# Patient Record
Sex: FEMALE | Race: OTHER | NOT HISPANIC OR LATINO | URBAN - METROPOLITAN AREA
[De-identification: names, ages, dates, MRNs, and addresses within clinical notes are randomized per-mention and may not be internally consistent; named-entity substitution may affect disease eponyms.]

---

## 2023-01-30 PROBLEM — Z00.00 ENCOUNTER FOR PREVENTIVE HEALTH EXAMINATION: Status: ACTIVE | Noted: 2023-01-30

## 2023-01-31 ENCOUNTER — EMERGENCY (EMERGENCY)
Facility: HOSPITAL | Age: 33
LOS: 0 days | Discharge: HOME | End: 2023-02-01
Attending: EMERGENCY MEDICINE | Admitting: EMERGENCY MEDICINE
Payer: MEDICAID

## 2023-01-31 VITALS
RESPIRATION RATE: 18 BRPM | SYSTOLIC BLOOD PRESSURE: 149 MMHG | OXYGEN SATURATION: 99 % | DIASTOLIC BLOOD PRESSURE: 82 MMHG | WEIGHT: 169.09 LBS | HEART RATE: 135 BPM | TEMPERATURE: 97 F | HEIGHT: 67 IN

## 2023-01-31 PROCEDURE — 99284 EMERGENCY DEPT VISIT MOD MDM: CPT

## 2023-02-01 VITALS
RESPIRATION RATE: 18 BRPM | SYSTOLIC BLOOD PRESSURE: 137 MMHG | TEMPERATURE: 97 F | HEART RATE: 100 BPM | DIASTOLIC BLOOD PRESSURE: 82 MMHG | OXYGEN SATURATION: 100 %

## 2023-02-01 DIAGNOSIS — E11.65 TYPE 2 DIABETES MELLITUS WITH HYPERGLYCEMIA: ICD-10-CM

## 2023-02-01 DIAGNOSIS — Z79.84 LONG TERM (CURRENT) USE OF ORAL HYPOGLYCEMIC DRUGS: ICD-10-CM

## 2023-02-01 DIAGNOSIS — O24.111 PRE-EXISTING TYPE 2 DIABETES MELLITUS, IN PREGNANCY, FIRST TRIMESTER: ICD-10-CM

## 2023-02-01 DIAGNOSIS — Z3A.01 LESS THAN 8 WEEKS GESTATION OF PREGNANCY: ICD-10-CM

## 2023-02-01 NOTE — ED PROVIDER NOTE - CARE PROVIDER_API CALL
Lianna Anthony)  Internal Medicine; Nephrology  [address] [place] [id number]  Phone: [phone]  Fax: [phone]  Follow Up Time:     Alen Love  ENDOCRINOLOGY/METAB/DIABETES  [address] [place] [address]  Phone: [phone]  Fax: [phone]  Follow Up Time:

## 2023-02-01 NOTE — ED PROVIDER NOTE - NSFOLLOWUPINSTRUCTIONS_ED_ALL_ED_FT
Follow up with your primary care doctor in 1-2 days.    FOLLOW UP WITH YOUR OB/GYN AT YOUR SCHEDULED APPOINTMENT ON THURSDAY FEB 2ND    follow up with endocrinology and nephrology in 1-2 weeks     Hyperglycemia  Hyperglycemia occurs when the level of sugar (glucose) in the blood is too high. Glucose is a type of sugar that provides the body's main source of energy. Certain hormones (insulin and glucagon) control the level of glucose in the blood. Insulin lowers blood glucose, and glucagon increases blood glucose. Hyperglycemia can result from having too little insulin in the bloodstream, or from the body not responding normally to insulin.    Hyperglycemia occurs most often in people who have diabetes (diabetes mellitus), but it can happen in people who do not have diabetes. It can develop quickly, and it can be life-threatening if it causes you to become severely dehydrated (diabetic ketoacidosis or hyperglycemic hyperosmolar state). Severe hyperglycemia is a medical emergency.    What are the causes?  If you have diabetes, hyperglycemia may be caused by:    Diabetes medicine.  Medicines that increase blood glucose or affect your diabetes control.  Not eating enough, or not eating often enough.  Changes in physical activity level.  Being sick or having an infection.    If you have prediabetes or undiagnosed diabetes:    Hyperglycemia may be caused by those conditions.    If you do not have diabetes, hyperglycemia may be caused by:    Certain medicines, including steroid medicines, beta-blockers, epinephrine, and thiazide diuretics.  Stress.  Serious illness.  Surgery.  Diseases of the pancreas.  Infection.    What increases the risk?  Hyperglycemia is more likely to develop in people who have risk factors for diabetes, such as:    Having a family member with diabetes.  Having a gene for type 1 diabetes that is passed from parent to child (inherited).  Living in an area with cold weather conditions.  Exposure to certain viruses.  Certain conditions in which the body's disease-fighting (immune) system attacks itself (autoimmune disorders).  Being overweight or obese.  Having an inactive (sedentary) lifestyle.  Having been diagnosed with insulin resistance.  Having a history of prediabetes, gestational diabetes, or polycystic ovarian syndrome (PCOS).  Being of American-, -American, /, or / descent.    What are the signs or symptoms?  Hyperglycemia may not cause any symptoms. If you do have symptoms, they may include early warning signs, such as:    Increased thirst.  Hunger.  Feeling very tired.  Needing to urinate more often than usual.  Blurry vision.    Other symptoms may develop if hyperglycemia gets worse, such as:    Dry mouth.  Loss of appetite.  Fruity-smelling breath.  Weakness.  Unexpected or rapid weight gain or weight loss.  Tingling or numbness in the hands or feet.  Headache.  Skin that does not quickly return to normal after being lightly pinched and released (poor skin turgor).  Abdominal pain.  Cuts or bruises that are slow to heal.    How is this diagnosed?  Hyperglycemia is diagnosed with a blood test to measure your blood glucose level. This blood test is usually done while you are having symptoms. Your health care provider may also do a physical exam and review your medical history.    You may have more tests to determine the cause of your hyperglycemia, such as:    A fasting blood glucose (FBG) test. You will not be allowed to eat (you will fast) for at least 8 hours before a blood sample is taken.  An A1c (hemoglobin A1c) blood test. This provides information about blood glucose control over the previous 2–3 months.  An oral glucose tolerance test (OGTT). This measures your blood glucose at two times:    After fasting. This is your baseline blood glucose level.  Two hours after drinking a beverage that contains glucose.      How is this treated?  Treatment depends on the cause of your hyperglycemia. Treatment may include:    Taking medicine to regulate your blood glucose levels. If you take insulin or other diabetes medicines, your medicine or dosage may be adjusted.  Lifestyle changes, such as exercising more, eating healthier foods, or losing weight.  Treating an illness or infection, if this caused your hyperglycemia.  Checking your blood glucose more often.  Stopping or reducing steroid medicines, if these caused your hyperglycemia.    If your hyperglycemia becomes severe and it results in hyperglycemic hyperosmolar state, you must be hospitalized and given IV fluids.    Follow these instructions at home:  General instructions     Take over-the-counter and prescription medicines only as told by your health care provider.  Do not use any products that contain nicotine or tobacco, such as cigarettes and e-cigarettes. If you need help quitting, ask your health care provider.  Limit alcohol intake to no more than 1 drink per day for nonpregnant women and 2 drinks per day for men. One drink equals 12 oz of beer, 5 oz of wine, or 1½ oz of hard liquor.  Learn to manage stress. If you need help with this, ask your health care provider.  Keep all follow-up visits as told by your health care provider. This is important.  Eating and drinking     Maintain a healthy weight.  Exercise regularly, as directed by your health care provider.  Stay hydrated, especially when you exercise, get sick, or spend time in hot temperatures.  Eat healthy foods, such as:    Lean proteins.  Complex carbohydrates.  Fresh fruits and vegetables.  Low-fat dairy products.  Healthy fats.    ImageDrink enough fluid to keep your urine clear or pale yellow.  If you have diabetes:     Image   Make sure you know the symptoms of hyperglycemia.  Follow your diabetes management plan, as told by your health care provider. Make sure you:    Take your insulin and medicines as directed.  Follow your exercise plan.  Follow your meal plan. Eat on time, and do not skip meals.  Check your blood glucose as often as directed. Make sure to check your blood glucose before and after exercise. If you exercise longer or in a different way than usual, check your blood glucose more often.  Follow your sick day plan whenever you cannot eat or drink normally. Make this plan in advance with your health care provider.    Share your diabetes management plan with people in your workplace, school, and household.  Check your urine for ketones when you are ill and as told by your health care provider.  Carry a medical alert card or wear medical alert jewelry.  Contact a health care provider if:  Your blood glucose is at or above 240 mg/dL (13.3 mmol/L) for 2 days in a row.  You have problems keeping your blood glucose in your target range.  You have frequent episodes of hyperglycemia.  Get help right away if:  You have difficulty breathing.  You have a change in how you think, feel, or act (mental status).  You have nausea or vomiting that does not go away.  These symptoms may represent a serious problem that is an emergency. Do not wait to see if the symptoms will go away. Get medical help right away. Call your local emergency services (911 in the [place] Do not drive yourself to the hospital.     Summary  Hyperglycemia occurs when the level of sugar (glucose) in the blood is too high.  Hyperglycemia is diagnosed with a blood test to measure your blood glucose level. This blood test is usually done while you are having symptoms. Your health care provider may also do a physical exam and review your medical history.  If you have diabetes, follow your diabetes management plan as told by your health care provider.  Contact your health care provider if you have problems keeping your blood glucose in your target range.  This information is not intended to replace advice given to you by your health care provider. Make sure you discuss any questions you have with your health care provider.    Pregnancy    WHAT YOU NEED TO KNOW:    A normal pregnancy lasts about 40 weeks. The first trimester lasts from your last period through the 12th week of pregnancy. The second trimester lasts from the 13th week of your pregnancy through the 23rd week. The third trimester lasts from your 24th week of pregnancy until your baby is born. If you know the date of your last period, your healthcare provider can estimate your due date. You may give birth to your baby any time from 37 weeks to 2 weeks after your due date.    DISCHARGE INSTRUCTIONS:    Return to the emergency department if:     You develop a severe headache that does not go away.      You have new or increased vision changes, such as blurred or spotted vision.      You have new or increased swelling in your face or hands.      You have pain or cramping in your abdomen or low back.      You have vaginal bleeding.    Contact your healthcare provider or obstetrician if:     You have abdominal cramps, pressure, or tightening.      You have a change in vaginal discharge.      You cannot keep food or drinks down, and you are losing weight.      You have chills or a fever.      You have vaginal itching, burning, or pain.       You have yellow, green, white, or foul-smelling vaginal discharge.      You have pain or burning when you urinate, less urine than usual, or pink or bloody urine.      You have questions or concerns about your condition or care.    Medicines:     Prenatal vitamins provide some of the extra vitamins and minerals you need during pregnancy. Prenatal vitamins may also help to decrease the risk of certain birth defects.       Take your medicine as directed. Contact your healthcare provider if you think your medicine is not helping or if you have side effects. Tell him or her if you are allergic to any medicine. Keep a list of the medicines, vitamins, and herbs you take. Include the amounts, and when and why you take them. Bring the list or the pill bottles to follow-up visits. Carry your medicine list with you in case of an emergency.    Follow up with your healthcare provider or obstetrician as directed: Go to all of your prenatal visits during your pregnancy. Write down your questions so you remember to ask them during your visits.    Body changes that may occur during your pregnancy:     Breast changes you will experience include tenderness and tingling during the early part of your pregnancy. Your breasts will become larger. You may need to use a support bra. You may see a thin, yellow fluid, called colostrum, leak from your nipples during the second trimester. Colostrum is a liquid that changes to milk about 3 days after you give birth.      Skin changes and stretch marks may occur during your pregnancy. You may have red marks, called stretch marks, on your skin. Stretch marks will usually fade after pregnancy. Use lotion if your skin is dry and itchy. The skin on your face, around your nipples, and below your belly button may darken. Most of the time, your skin will return to its normal color after your baby is born.       Morning sickness is nausea and vomiting that can happen at any time of day. Avoid fatty and spicy foods. Eat small meals throughout the day instead of large meals. Shauna may help to decrease nausea. Ask your healthcare provider about other ways of decreasing nausea and vomiting.      Heartburn may be caused by changes in your hormones during pregnancy. Your growing uterus may also push your stomach upward and force stomach acid to back up into your esophagus. Eat 4 or 5 small meals each day instead of large meals. Avoid spicy foods. Avoid eating right before bedtime.      Constipation may develop during your pregnancy. To treat constipation, eat foods high in fiber such as fiber cereals, beans, fruits, vegetables, whole-grain breads, and prune juice. Get regular exercise and drink plenty of water. Your healthcare provider may also suggest a fiber supplement to soften your bowel movements. Talk to your healthcare provider before you use any medicines to decrease constipation.      Hemorrhoids are enlarged veins in the rectal area. They may cause pain, itching, and bright red bleeding from your rectum. To decrease your risk of hemorrhoids, prevent constipation and do not strain to have a bowel movement. If you have hemorrhoids, soak in a tub of warm water to ease discomfort. Ask your healthcare provider how you can treat hemorrhoids.       Leg cramps and swelling may be caused by low calcium levels or the added weight of pregnancy. Raise your legs above the level of your heart to decrease swelling. During a leg cramp, stretch or massage the muscle that has the cramp. Heat may help decrease pain and muscle spasms. Apply heat on your muscle for 20 to 30 minutes every 2 hours for as many days as directed.      Back pain may occur as your baby grows. Do not stand for long periods of time or lift heavy items. Use good posture while you stand, squat, or bend. Wear low-heeled shoes with good support. Rest may also help to relieve back pain. Ask your healthcare provider about exercises you can do to strengthen your back muscles.     Stay healthy during your pregnancy:     Eat a variety of healthy foods. Healthy foods include fruits, vegetables, whole-grain breads, low-fat dairy foods, beans, lean meats, and fish. Drink liquids as directed. Ask how much liquid to drink each day and which liquids are best for you. Limit caffeine to less than 200 milligrams each day. Limit your intake of fish to 2 servings each week. Choose fish low in mercury such as canned light tuna, shrimp, crab, salmon, cod, or tilapia. Do not eat fish high in mercury such as swordfish, tilefish, alicja mackerel, and shark.       Take prenatal vitamins as directed. Your need for certain vitamins and minerals, such as folic acid, increases during pregnancy. Prenatal vitamins provide some of the extra vitamins and minerals you need. Prenatal vitamins may also help to decrease the risk of certain birth defects.       Ask how much weight you should gain during your pregnancy. Too much or too little weight gain can be unhealthy for you and your baby.       Talk to your healthcare provider about exercise. Moderate exercise can help you stay fit. Your healthcare provider will help you plan an exercise program that is safe for you during pregnancy.       Do not smoke. Smoking increases your risk of a miscarriage and other health problems during your pregnancy. Smoking can cause your baby to be born too early or weigh less at birth. Quit smoking as soon as you think you might be pregnant. Ask your healthcare provider for information if you need help quitting.      Do not drink alcohol. Alcohol passes from your body to your baby through the placenta. It can affect your baby's brain development and cause fetal alcohol syndrome (FAS). FAS is a group of conditions that causes mental, behavior, and growth problems.       Talk to your healthcare provider before you take any medicines. Many medicines may harm your baby if you take them when you are pregnant. Do not take any medicines, vitamins, herbs, or supplements without first talking to your healthcare provider. Never use illegal or street drugs (such as marijuana or cocaine) while you are pregnant.     Safety tips:     Avoid hot tubs and saunas. Do not use a hot tub or sauna while you are pregnant, especially during your first trimester. Hot tubs and saunas may raise your baby's temperature and increase the risk of birth defects.      Avoid toxoplasmosis. This is an infection caused by eating raw meat or being around infected cat feces. It can cause birth defects, miscarriages, and other problems. Wash your hands after you touch raw meat. Make sure any meat is well-cooked before you eat it. Avoid raw eggs and unpasteurized milk. Use gloves or ask someone else to clean your cat's litter box while you are pregnant.       Ask your healthcare provider about travel. The most comfortable time to travel is during the second trimester. Ask your healthcare provider if you can travel after 36 weeks. You may not be able to travel in an airplane after 36 weeks. He may also recommend that you avoid long road trips.          © Copyright [employer] 2019 All illustrations and images included in CareNotes are the copyrighted property of [employer] or [employer]. Follow up with your primary care doctor in 1-2 days.    FOLLOW UP WITH YOUR OB/GYN AT YOUR SCHEDULED APPOINTMENT ON THURSDAY FEB 2ND    follow up with endocrinology and nephrology in 1-2 weeks     Our Emergency Department Referral Coordinators will be reaching out to you in the next 24-48 hours from 9:00am to 5:00pm with a follow up appointment. Please expect a phone call from the hospital in that time frame. If you do not receive a call or if you have any questions or concerns, you can reach them at [phone]      Hyperglycemia  Hyperglycemia occurs when the level of sugar (glucose) in the blood is too high. Glucose is a type of sugar that provides the body's main source of energy. Certain hormones (insulin and glucagon) control the level of glucose in the blood. Insulin lowers blood glucose, and glucagon increases blood glucose. Hyperglycemia can result from having too little insulin in the bloodstream, or from the body not responding normally to insulin.    Hyperglycemia occurs most often in people who have diabetes (diabetes mellitus), but it can happen in people who do not have diabetes. It can develop quickly, and it can be life-threatening if it causes you to become severely dehydrated (diabetic ketoacidosis or hyperglycemic hyperosmolar state). Severe hyperglycemia is a medical emergency.    What are the causes?  If you have diabetes, hyperglycemia may be caused by:    Diabetes medicine.  Medicines that increase blood glucose or affect your diabetes control.  Not eating enough, or not eating often enough.  Changes in physical activity level.  Being sick or having an infection.    If you have prediabetes or undiagnosed diabetes:    Hyperglycemia may be caused by those conditions.    If you do not have diabetes, hyperglycemia may be caused by:    Certain medicines, including steroid medicines, beta-blockers, epinephrine, and thiazide diuretics.  Stress.  Serious illness.  Surgery.  Diseases of the pancreas.  Infection.    What increases the risk?  Hyperglycemia is more likely to develop in people who have risk factors for diabetes, such as:    Having a family member with diabetes.  Having a gene for type 1 diabetes that is passed from parent to child (inherited).  Living in an area with cold weather conditions.  Exposure to certain viruses.  Certain conditions in which the body's disease-fighting (immune) system attacks itself (autoimmune disorders).  Being overweight or obese.  Having an inactive (sedentary) lifestyle.  Having been diagnosed with insulin resistance.  Having a history of prediabetes, gestational diabetes, or polycystic ovarian syndrome (PCOS).  Being of American-, -American, /, or / descent.    What are the signs or symptoms?  Hyperglycemia may not cause any symptoms. If you do have symptoms, they may include early warning signs, such as:    Increased thirst.  Hunger.  Feeling very tired.  Needing to urinate more often than usual.  Blurry vision.    Other symptoms may develop if hyperglycemia gets worse, such as:    Dry mouth.  Loss of appetite.  Fruity-smelling breath.  Weakness.  Unexpected or rapid weight gain or weight loss.  Tingling or numbness in the hands or feet.  Headache.  Skin that does not quickly return to normal after being lightly pinched and released (poor skin turgor).  Abdominal pain.  Cuts or bruises that are slow to heal.    How is this diagnosed?  Hyperglycemia is diagnosed with a blood test to measure your blood glucose level. This blood test is usually done while you are having symptoms. Your health care provider may also do a physical exam and review your medical history.    You may have more tests to determine the cause of your hyperglycemia, such as:    A fasting blood glucose (FBG) test. You will not be allowed to eat (you will fast) for at least 8 hours before a blood sample is taken.  An A1c (hemoglobin A1c) blood test. This provides information about blood glucose control over the previous 2–3 months.  An oral glucose tolerance test (OGTT). This measures your blood glucose at two times:    After fasting. This is your baseline blood glucose level.  Two hours after drinking a beverage that contains glucose.      How is this treated?  Treatment depends on the cause of your hyperglycemia. Treatment may include:    Taking medicine to regulate your blood glucose levels. If you take insulin or other diabetes medicines, your medicine or dosage may be adjusted.  Lifestyle changes, such as exercising more, eating healthier foods, or losing weight.  Treating an illness or infection, if this caused your hyperglycemia.  Checking your blood glucose more often.  Stopping or reducing steroid medicines, if these caused your hyperglycemia.    If your hyperglycemia becomes severe and it results in hyperglycemic hyperosmolar state, you must be hospitalized and given IV fluids.    Follow these instructions at home:  General instructions     Take over-the-counter and prescription medicines only as told by your health care provider.  Do not use any products that contain nicotine or tobacco, such as cigarettes and e-cigarettes. If you need help quitting, ask your health care provider.  Limit alcohol intake to no more than 1 drink per day for nonpregnant women and 2 drinks per day for men. One drink equals 12 oz of beer, 5 oz of wine, or 1½ oz of hard liquor.  Learn to manage stress. If you need help with this, ask your health care provider.  Keep all follow-up visits as told by your health care provider. This is important.  Eating and drinking     Maintain a healthy weight.  Exercise regularly, as directed by your health care provider.  Stay hydrated, especially when you exercise, get sick, or spend time in hot temperatures.  Eat healthy foods, such as:    Lean proteins.  Complex carbohydrates.  Fresh fruits and vegetables.  Low-fat dairy products.  Healthy fats.    ImageDrink enough fluid to keep your urine clear or pale yellow.  If you have diabetes:     Image   Make sure you know the symptoms of hyperglycemia.  Follow your diabetes management plan, as told by your health care provider. Make sure you:    Take your insulin and medicines as directed.  Follow your exercise plan.  Follow your meal plan. Eat on time, and do not skip meals.  Check your blood glucose as often as directed. Make sure to check your blood glucose before and after exercise. If you exercise longer or in a different way than usual, check your blood glucose more often.  Follow your sick day plan whenever you cannot eat or drink normally. Make this plan in advance with your health care provider.    Share your diabetes management plan with people in your workplace, school, and household.  Check your urine for ketones when you are ill and as told by your health care provider.  Carry a medical alert card or wear medical alert jewelry.  Contact a health care provider if:  Your blood glucose is at or above 240 mg/dL (13.3 mmol/L) for 2 days in a row.  You have problems keeping your blood glucose in your target range.  You have frequent episodes of hyperglycemia.  Get help right away if:  You have difficulty breathing.  You have a change in how you think, feel, or act (mental status).  You have nausea or vomiting that does not go away.  These symptoms may represent a serious problem that is an emergency. Do not wait to see if the symptoms will go away. Get medical help right away. Call your local emergency services (911 in the [place] Do not drive yourself to the hospital.     Summary  Hyperglycemia occurs when the level of sugar (glucose) in the blood is too high.  Hyperglycemia is diagnosed with a blood test to measure your blood glucose level. This blood test is usually done while you are having symptoms. Your health care provider may also do a physical exam and review your medical history.  If you have diabetes, follow your diabetes management plan as told by your health care provider.  Contact your health care provider if you have problems keeping your blood glucose in your target range.  This information is not intended to replace advice given to you by your health care provider. Make sure you discuss any questions you have with your health care provider.    Pregnancy    WHAT YOU NEED TO KNOW:    A normal pregnancy lasts about 40 weeks. The first trimester lasts from your last period through the 12th week of pregnancy. The second trimester lasts from the 13th week of your pregnancy through the 23rd week. The third trimester lasts from your 24th week of pregnancy until your baby is born. If you know the date of your last period, your healthcare provider can estimate your due date. You may give birth to your baby any time from 37 weeks to 2 weeks after your due date.    DISCHARGE INSTRUCTIONS:    Return to the emergency department if:     You develop a severe headache that does not go away.      You have new or increased vision changes, such as blurred or spotted vision.      You have new or increased swelling in your face or hands.      You have pain or cramping in your abdomen or low back.      You have vaginal bleeding.    Contact your healthcare provider or obstetrician if:     You have abdominal cramps, pressure, or tightening.      You have a change in vaginal discharge.      You cannot keep food or drinks down, and you are losing weight.      You have chills or a fever.      You have vaginal itching, burning, or pain.       You have yellow, green, white, or foul-smelling vaginal discharge.      You have pain or burning when you urinate, less urine than usual, or pink or bloody urine.      You have questions or concerns about your condition or care.    Medicines:     Prenatal vitamins provide some of the extra vitamins and minerals you need during pregnancy. Prenatal vitamins may also help to decrease the risk of certain birth defects.       Take your medicine as directed. Contact your healthcare provider if you think your medicine is not helping or if you have side effects. Tell him or her if you are allergic to any medicine. Keep a list of the medicines, vitamins, and herbs you take. Include the amounts, and when and why you take them. Bring the list or the pill bottles to follow-up visits. Carry your medicine list with you in case of an emergency.    Follow up with your healthcare provider or obstetrician as directed: Go to all of your prenatal visits during your pregnancy. Write down your questions so you remember to ask them during your visits.    Body changes that may occur during your pregnancy:     Breast changes you will experience include tenderness and tingling during the early part of your pregnancy. Your breasts will become larger. You may need to use a support bra. You may see a thin, yellow fluid, called colostrum, leak from your nipples during the second trimester. Colostrum is a liquid that changes to milk about 3 days after you give birth.      Skin changes and stretch marks may occur during your pregnancy. You may have red marks, called stretch marks, on your skin. Stretch marks will usually fade after pregnancy. Use lotion if your skin is dry and itchy. The skin on your face, around your nipples, and below your belly button may darken. Most of the time, your skin will return to its normal color after your baby is born.       Morning sickness is nausea and vomiting that can happen at any time of day. Avoid fatty and spicy foods. Eat small meals throughout the day instead of large meals. Shauna may help to decrease nausea. Ask your healthcare provider about other ways of decreasing nausea and vomiting.      Heartburn may be caused by changes in your hormones during pregnancy. Your growing uterus may also push your stomach upward and force stomach acid to back up into your esophagus. Eat 4 or 5 small meals each day instead of large meals. Avoid spicy foods. Avoid eating right before bedtime.      Constipation may develop during your pregnancy. To treat constipation, eat foods high in fiber such as fiber cereals, beans, fruits, vegetables, whole-grain breads, and prune juice. Get regular exercise and drink plenty of water. Your healthcare provider may also suggest a fiber supplement to soften your bowel movements. Talk to your healthcare provider before you use any medicines to decrease constipation.      Hemorrhoids are enlarged veins in the rectal area. They may cause pain, itching, and bright red bleeding from your rectum. To decrease your risk of hemorrhoids, prevent constipation and do not strain to have a bowel movement. If you have hemorrhoids, soak in a tub of warm water to ease discomfort. Ask your healthcare provider how you can treat hemorrhoids.       Leg cramps and swelling may be caused by low calcium levels or the added weight of pregnancy. Raise your legs above the level of your heart to decrease swelling. During a leg cramp, stretch or massage the muscle that has the cramp. Heat may help decrease pain and muscle spasms. Apply heat on your muscle for 20 to 30 minutes every 2 hours for as many days as directed.      Back pain may occur as your baby grows. Do not stand for long periods of time or lift heavy items. Use good posture while you stand, squat, or bend. Wear low-heeled shoes with good support. Rest may also help to relieve back pain. Ask your healthcare provider about exercises you can do to strengthen your back muscles.     Stay healthy during your pregnancy:     Eat a variety of healthy foods. Healthy foods include fruits, vegetables, whole-grain breads, low-fat dairy foods, beans, lean meats, and fish. Drink liquids as directed. Ask how much liquid to drink each day and which liquids are best for you. Limit caffeine to less than 200 milligrams each day. Limit your intake of fish to 2 servings each week. Choose fish low in mercury such as canned light tuna, shrimp, crab, salmon, cod, or tilapia. Do not eat fish high in mercury such as swordfish, tilefish, alicja mackerel, and shark.       Take prenatal vitamins as directed. Your need for certain vitamins and minerals, such as folic acid, increases during pregnancy. Prenatal vitamins provide some of the extra vitamins and minerals you need. Prenatal vitamins may also help to decrease the risk of certain birth defects.       Ask how much weight you should gain during your pregnancy. Too much or too little weight gain can be unhealthy for you and your baby.       Talk to your healthcare provider about exercise. Moderate exercise can help you stay fit. Your healthcare provider will help you plan an exercise program that is safe for you during pregnancy.       Do not smoke. Smoking increases your risk of a miscarriage and other health problems during your pregnancy. Smoking can cause your baby to be born too early or weigh less at birth. Quit smoking as soon as you think you might be pregnant. Ask your healthcare provider for information if you need help quitting.      Do not drink alcohol. Alcohol passes from your body to your baby through the placenta. It can affect your baby's brain development and cause fetal alcohol syndrome (FAS). FAS is a group of conditions that causes mental, behavior, and growth problems.       Talk to your healthcare provider before you take any medicines. Many medicines may harm your baby if you take them when you are pregnant. Do not take any medicines, vitamins, herbs, or supplements without first talking to your healthcare provider. Never use illegal or street drugs (such as marijuana or cocaine) while you are pregnant.     Safety tips:     Avoid hot tubs and saunas. Do not use a hot tub or sauna while you are pregnant, especially during your first trimester. Hot tubs and saunas may raise your baby's temperature and increase the risk of birth defects.      Avoid toxoplasmosis. This is an infection caused by eating raw meat or being around infected cat feces. It can cause birth defects, miscarriages, and other problems. Wash your hands after you touch raw meat. Make sure any meat is well-cooked before you eat it. Avoid raw eggs and unpasteurized milk. Use gloves or ask someone else to clean your cat's litter box while you are pregnant.       Ask your healthcare provider about travel. The most comfortable time to travel is during the second trimester. Ask your healthcare provider if you can travel after 36 weeks. You may not be able to travel in an airplane after 36 weeks. He may also recommend that you avoid long road trips.          © Copyright [employer] 2019 All illustrations and images included in CareNotes are the copyrighted property of [employer] or [employer].

## 2023-02-01 NOTE — ED PROVIDER NOTE - CLINICAL SUMMARY MEDICAL DECISION MAKING FREE TEXT BOX
32yF on janumet/metformin for  DM after  1st pregnancy    pw  + pregnancy test 3 days ago LMP dec 27 ,  has  first appointment with OB  in 2 days,  no abdominal pain or vaginal bleeding   , presents for  outpt elevated hgb a1c of 8, and high albumin/crea ratio   done outpt  by PMD  Dr zapata.  FS in .   Dw patient  no emergency intervention at this time. discussed and given outpt endocrine and nephrology follow up . Pt and  verbalized understanding to outtpt speciality follow  up  Patient to be discharged from ED in well appearing condition. Any available test results were discussed with and printed  for patient.  Verbal instructions given, including instructions to return to ED immediately for any new, worsening, or concerning symptoms. Limitations of ED work up discussed.  Patient reports understanding of above with capacity and insight. Written discharge instructions additionally given, including follow-up plan.

## 2023-02-01 NOTE — ED PROVIDER NOTE - NS ED ATTENDING STATEMENT MOD
This was a shared visit with the NICO. I reviewed and verified the documentation and independently performed the documented:

## 2023-02-01 NOTE — ED PROVIDER NOTE - NSPTACCESSSVCSAPPTDETAILS_ED_ALL_ED_FT
Patient needs follow up with Endocrinology and Nephrology for diabetes management and protein in urine. patient is also pregnant. follow up in 1-2 weeks

## 2023-02-01 NOTE — ED PROVIDER NOTE - CARE PROVIDERS DIRECT ADDRESSES
,[email or web address]_Unknown

## 2023-02-01 NOTE — ED PROVIDER NOTE - OBJECTIVE STATEMENT
32-year-old female past medical history of diabetes comes in the emergency room for elevated blood sugar.  Patient states that she tested positive for pregnancy 3 days ago, was called by primary care due to abnormal lab work and was advised due to pregnancy to come to the emergency room for further evaluation.  Patient states lab work was done last week prior to the knowledge of her pregnancy.  Patient states that she is currently on metformin for diabetes.  Patient states the abnormal lab result the doctor was concerned about was a hemoglobin A1c of 8 and an elevated albumin creatinine ratio, trace protein in urine.  Patient states she did have a history of gestational diabetes which led her to become diabetic.  Patient also admits to having preeclampsia during last trimester.  Patient has not followed up yet with primary care doctor, endocrinology, nephrology for these laboratory values.  Patient denies chest pain shortness of breath fever chills patient denies vaginal bleeding abd cramps

## 2023-02-01 NOTE — ED PROVIDER NOTE - PROGRESS NOTE DETAILS
Patient has OB/GYN appointment scheduled for Thursday, February 2.  Advised patient as well these are abnormal laboratory values they can be followed up outpatient with nephrology and endocrinology.  Patient placed in rapid referral system and number provided for referral coordinator to help expedite care

## 2023-02-01 NOTE — ED PROVIDER NOTE - PATIENT PORTAL LINK FT
You can access the FollowMyHealth Patient Portal offered by [facility] by registering at the following website: [email or web address]. By joining [facility]’s FollowMyHealth portal, you will also be able to view your health information using other applications (apps) compatible with our system.

## 2023-02-02 ENCOUNTER — APPOINTMENT (OUTPATIENT)
Dept: OBGYN | Facility: CLINIC | Age: 33
End: 2023-02-02
Payer: MEDICAID

## 2023-02-02 ENCOUNTER — ASOB RESULT (OUTPATIENT)
Age: 33
End: 2023-02-02

## 2023-02-02 VITALS
HEIGHT: 67 IN | BODY MASS INDEX: 26.53 KG/M2 | HEART RATE: 90 BPM | WEIGHT: 169 LBS | SYSTOLIC BLOOD PRESSURE: 124 MMHG | DIASTOLIC BLOOD PRESSURE: 80 MMHG

## 2023-02-02 DIAGNOSIS — Z78.9 OTHER SPECIFIED HEALTH STATUS: ICD-10-CM

## 2023-02-02 DIAGNOSIS — N91.2 AMENORRHEA, UNSPECIFIED: ICD-10-CM

## 2023-02-02 DIAGNOSIS — Z83.3 FAMILY HISTORY OF DIABETES MELLITUS: ICD-10-CM

## 2023-02-02 LAB
BILIRUB UR QL STRIP: NORMAL
CLARITY UR: NORMAL
COLLECTION METHOD: NORMAL
GLUCOSE UR-MCNC: NORMAL
HCG UR QL: 0.2 EU/DL
HCG UR QL: POSITIVE
HGB UR QL STRIP.AUTO: ABNORMAL
KETONES UR-MCNC: ABNORMAL
LEUKOCYTE ESTERASE UR QL STRIP: NORMAL
NITRITE UR QL STRIP: NORMAL
PH UR STRIP: 6
PROT UR STRIP-MCNC: ABNORMAL
QUALITY CONTROL: YES
SP GR UR STRIP: 1.02

## 2023-02-02 PROCEDURE — 81025 URINE PREGNANCY TEST: CPT

## 2023-02-02 PROCEDURE — 81003 URINALYSIS AUTO W/O SCOPE: CPT | Mod: 59,QW

## 2023-02-02 PROCEDURE — ZZZZZ: CPT

## 2023-02-02 PROCEDURE — 99204 OFFICE O/P NEW MOD 45 MIN: CPT

## 2023-02-02 PROCEDURE — 76830 TRANSVAGINAL US NON-OB: CPT | Mod: 59

## 2023-02-02 RX ORDER — METFORMIN HYDROCHLORIDE 1000 MG/1
1000 TABLET, COATED ORAL
Refills: 0 | Status: ACTIVE | COMMUNITY

## 2023-02-02 NOTE — PROCEDURE
[Amenorrhea] : Amenorrhea [Transvaginal Ultrasound] : transvaginal ultrasound [FreeTextEntry4] : NO IUP SEEN, CYSTIC STRUCTURE NOTED IN THE ENDOMETRIUM

## 2023-02-02 NOTE — PHYSICAL EXAM
[Appropriately responsive] : appropriately responsive [Alert] : alert [No Acute Distress] : no acute distress [No Lymphadenopathy] : no lymphadenopathy [Regular Rate Rhythm] : regular rate rhythm [No Murmurs] : no murmurs [Clear to Auscultation B/L] : clear to auscultation bilaterally [Soft] : soft [Non-tender] : non-tender [Non-distended] : non-distended [No HSM] : No HSM [No Mass] : no mass [No Lesions] : no lesions [Oriented x3] : oriented x3

## 2023-02-02 NOTE — DISCUSSION/SUMMARY
[FreeTextEntry1] : 33 YO  LMP 12/27 AMENORRHEA, POSITIVE PREGNANCY TEST\par SONO - CYSTIC STRUCTURE\par BHGC, PG, T &s\par MFM CONSULT- NOT WELL CONTROLLED DM\par BSQ4 /DAY FOR MFM TO EVALUATE- APPNT MADE 2/7\par RISK OF POORLY CONTROLLED DM IN PREGNANCY INCLUDING BUT NOT LIMITED TO CARDIAC DEVELOPMENT ABNORMALITIES AND SHOULDER  DYSTOCIA  D/ W PATIENT\par F/UP 2 WKS \par LAB WORK REVIEWD\par PNV\par ZOFRAN, B6\par VIT D \par \par

## 2023-02-02 NOTE — HISTORY OF PRESENT ILLNESS
[Y] : Positive pregnancy history [Patient reported PAP Smear was normal] : Patient reported PAP Smear was normal [TextBox_4] : GYNHX\par No history of fibroids, cysts, or STDs\par 15/reg/5, last pap 2019 nl per pt [PapSmeardate] : 2019 [PGxTotal] : 1 [[facility] CenterxFulerm] : 1 [[facility] Hospitaliving] : 1 [FreeTextEntry1] :

## 2023-02-02 NOTE — COUNSELING
[Nutrition/ Exercise/ Weight Management] : nutrition, exercise, weight management [Vitamins/Supplements] : vitamins/supplements [Preconception Care/ Fertility options] : preconception care, fertility options [Pregnancy Options] : pregnancy options

## 2023-02-06 LAB
ABO + RH PNL BLD: NORMAL
BASOPHILS # BLD AUTO: 0.06 K/UL
BASOPHILS NFR BLD AUTO: 1 %
EOSINOPHIL # BLD AUTO: 0.16 K/UL
EOSINOPHIL NFR BLD AUTO: 2.7 %
HCG SERPL-MCNC: 611.9 MIU/ML
HCT VFR BLD CALC: 39.3 %
HGB BLD-MCNC: 12.5 G/DL
IMM GRANULOCYTES NFR BLD AUTO: 0.7 %
LYMPHOCYTES # BLD AUTO: 1.6 K/UL
LYMPHOCYTES NFR BLD AUTO: 27.5 %
MAN DIFF?: NORMAL
MCHC RBC-ENTMCNC: 23.9 PG
MCHC RBC-ENTMCNC: 31.8 G/DL
MCV RBC AUTO: 75.3 FL
MONOCYTES # BLD AUTO: 0.61 K/UL
MONOCYTES NFR BLD AUTO: 10.5 %
NEUTROPHILS # BLD AUTO: 3.35 K/UL
NEUTROPHILS NFR BLD AUTO: 57.6 %
PLATELET # BLD AUTO: 410 K/UL
PROGEST SERPL-MCNC: 10.7 NG/ML
RBC # BLD: 5.22 M/UL
RBC # FLD: 15.9 %
WBC # FLD AUTO: 5.82 K/UL

## 2023-02-07 ENCOUNTER — APPOINTMENT (OUTPATIENT)
Dept: ANTEPARTUM | Facility: CLINIC | Age: 33
End: 2023-02-07
Payer: MEDICAID

## 2023-02-07 ENCOUNTER — RESULT CHARGE (OUTPATIENT)
Age: 33
End: 2023-02-07

## 2023-02-07 ENCOUNTER — APPOINTMENT (OUTPATIENT)
Dept: ANTEPARTUM | Facility: CLINIC | Age: 33
End: 2023-02-07

## 2023-02-07 ENCOUNTER — ASOB RESULT (OUTPATIENT)
Age: 33
End: 2023-02-07

## 2023-02-07 ENCOUNTER — OUTPATIENT (OUTPATIENT)
Dept: OUTPATIENT SERVICES | Facility: HOSPITAL | Age: 33
LOS: 1 days | End: 2023-02-07
Payer: MEDICAID

## 2023-02-07 VITALS
WEIGHT: 168 LBS | OXYGEN SATURATION: 100 % | BODY MASS INDEX: 26.31 KG/M2 | HEART RATE: 103 BPM | DIASTOLIC BLOOD PRESSURE: 79 MMHG | TEMPERATURE: 98.5 F | SYSTOLIC BLOOD PRESSURE: 139 MMHG

## 2023-02-07 DIAGNOSIS — O09.90 SUPERVISION OF HIGH RISK PREGNANCY, UNSPECIFIED, UNSPECIFIED TRIMESTER: ICD-10-CM

## 2023-02-07 LAB — GLUCOSE BLDC GLUCOMTR-MCNC: 129 MG/DL — HIGH (ref 70–99)

## 2023-02-07 PROCEDURE — 76815 OB US LIMITED FETUS(S): CPT | Mod: 26

## 2023-02-07 PROCEDURE — 81025 URINE PREGNANCY TEST: CPT

## 2023-02-07 PROCEDURE — 99214 OFFICE O/P EST MOD 30 MIN: CPT | Mod: TH,25

## 2023-02-07 PROCEDURE — 99214 OFFICE O/P EST MOD 30 MIN: CPT

## 2023-02-07 PROCEDURE — 82962 GLUCOSE BLOOD TEST: CPT

## 2023-02-07 PROCEDURE — 76815 OB US LIMITED FETUS(S): CPT

## 2023-02-07 RX ORDER — BLOOD SUGAR DIAGNOSTIC
STRIP MISCELLANEOUS
Qty: 120 | Refills: 4 | Status: ACTIVE | COMMUNITY
Start: 2023-02-07 | End: 1900-01-01

## 2023-02-07 NOTE — DISCUSSION/SUMMARY
[FreeTextEntry1] : 23\par MFM Att'g and PGY-3 Consult Note:\par 32y  at 6w0d by LMP 22, presents for MFM consult for T2DM. Referred by Dr. Cason. Denies any abdominal cramping or vaginal bleeding.  \par PMHx: 2020: T2DM, Dx'd after her last pregnancy w/GDMA-2. Last Hb A1C on 23 was 8.0%. \par            Denies Chr Htn, Asthma, other illnesses. 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 English proficiency limited. Recent arrival from [place].  Denies substance abuse x3.\par OBhx: \par            2019: , GDMA-2 Rx'd Glyburide. \par                Postpartum Preeclampsia w/severe features, received magnesium, Labetalol and Nifedipine which she took for 3w then                     discontinued by her PMD. \par                Postpartum d3 hemorrhage requiring 5u pRBC, had a "balloon" placed to stop the bleeding.\par GYN: denies\par Vaccinations: Influenza: declines.  Strong recommendation for flu vaccination in pregnancy reviewed. \par                        CoVID: Rec'd J&J in [place].  Declines boosters.    " . \par ROS: As above\par \par Impression/Recommendations:\par 33 y/o  at 6w0d, by LMP 22, presents for MFM consult for T2DM. Co-managed with Dr. Cason\par 1. T2DM:  Patient was  She was previously on Janumet 50/1000mg daily. She discontinued it 10 days ago when she found out she was pregnant and switched to Metformin 1000mg BID with suboptimal glucose control.\par FS log (2/3-2/6)\par Fasting 170-190 (4/4 elevated values)\par 2Hr PP: 124-170 (6/6 elevated values, 2>150)\par Office FS: 129 (fasting)\par - Currently taking Metformin 1000mg BID. Discussed hospital admission to transition to insulin regimen to obtain tight glucose control vs outpatient transition, knowing that it may take longer. Patient opted for the second option, understanding that hospital admission is what is recommended especially during organogenesis. \par - Will start her on NPH 20u at bedtime and continue current dose of Metformin. She will return on Thursday to review FS and continue to transition off Metformin. \par Rx sent; Diabetes teaching by RN done this afternoon. \par \par 2. Pregnancy\par - GS seen in the ultrasound today. Will need follow up sonogram in 2w to confirm pregnancy.\par - Continue routine prenatal care with Dr. Cason. \par \par RTC on Thursday for diabetes educator and titration of insulin. \par \par MD Alesia, FACOG with BIB Shi MD, PGY-3

## 2023-02-08 DIAGNOSIS — O24.111 PRE-EXISTING TYPE 2 DIABETES MELLITUS, IN PREGNANCY, FIRST TRIMESTER: ICD-10-CM

## 2023-02-08 DIAGNOSIS — Z3A.01 LESS THAN 8 WEEKS GESTATION OF PREGNANCY: ICD-10-CM

## 2023-02-08 DIAGNOSIS — O26.841 UTERINE SIZE-DATE DISCREPANCY, FIRST TRIMESTER: ICD-10-CM

## 2023-02-08 RX ORDER — DM/PE/ACETAMINOPHEN/CHLORPHENR 10-5-325-2
28-0.8 TABLET, SEQUENTIAL ORAL DAILY
Qty: 90 | Refills: 1 | Status: ACTIVE | COMMUNITY
Start: 2023-02-07 | End: 1900-01-01

## 2023-02-09 ENCOUNTER — APPOINTMENT (OUTPATIENT)
Age: 33
End: 2023-02-09

## 2023-02-14 ENCOUNTER — APPOINTMENT (OUTPATIENT)
Dept: ANTEPARTUM | Facility: CLINIC | Age: 33
End: 2023-02-14
Payer: MEDICAID

## 2023-02-14 ENCOUNTER — OUTPATIENT (OUTPATIENT)
Dept: OUTPATIENT SERVICES | Facility: HOSPITAL | Age: 33
LOS: 1 days | End: 2023-02-14
Payer: MEDICAID

## 2023-02-14 ENCOUNTER — RESULT CHARGE (OUTPATIENT)
Age: 33
End: 2023-02-14

## 2023-02-14 VITALS
OXYGEN SATURATION: 100 % | WEIGHT: 168 LBS | SYSTOLIC BLOOD PRESSURE: 142 MMHG | BODY MASS INDEX: 26.31 KG/M2 | DIASTOLIC BLOOD PRESSURE: 79 MMHG | TEMPERATURE: 98.2 F | HEART RATE: 115 BPM

## 2023-02-14 DIAGNOSIS — O09.90 SUPERVISION OF HIGH RISK PREGNANCY, UNSPECIFIED, UNSPECIFIED TRIMESTER: ICD-10-CM

## 2023-02-14 LAB
BILIRUB UR QL STRIP: NORMAL
BILIRUB UR QL STRIP: NORMAL
BP DIAS: 79 MM HG
BP DIAS: 79 MM HG
BP SYS: 139 MM HG
BP SYS: 142 MM HG
CLARITY UR: CLEAR
CLARITY UR: CLEAR
COLLECTION METHOD: NORMAL
COLLECTION METHOD: NORMAL
FETAL HEART RATE (BPM): 110
GLUCOSE BLDC GLUCOMTR-MCNC: 113
GLUCOSE BLDC GLUCOMTR-MCNC: 113 MG/DL — HIGH (ref 70–99)
GLUCOSE BLDC GLUCOMTR-MCNC: 129
GLUCOSE UR-MCNC: NORMAL
GLUCOSE UR-MCNC: NORMAL
HCG UR QL: 0.2 EU/DL
HCG UR QL: 0.2 EU/DL
HGB UR QL STRIP.AUTO: NORMAL
HGB UR QL STRIP.AUTO: NORMAL
KETONES UR-MCNC: NORMAL
KETONES UR-MCNC: NORMAL
LEUKOCYTE ESTERASE UR QL STRIP: NORMAL
LEUKOCYTE ESTERASE UR QL STRIP: NORMAL
NITRITE UR QL STRIP: NORMAL
NITRITE UR QL STRIP: NORMAL
OB COMMENTS: NORMAL
OB COMMENTS: NORMAL
PH UR STRIP: 5
PH UR STRIP: 6
PROT UR STRIP-MCNC: NORMAL
PROT UR STRIP-MCNC: NORMAL
SCHEDULED VISIT: YES
SCHEDULED VISIT: YES
SP GR UR STRIP: 1.02
SP GR UR STRIP: 1.03
URINE ALBUMIN/PROTEIN: NORMAL
URINE ALBUMIN/PROTEIN: NORMAL
URINE GLUCOSE: NORMAL
URINE GLUCOSE: NORMAL
URINE KETONES: NORMAL
URINE KETONES: NORMAL
WEEKS GESTATION: 7
WEEKS GESTATION: NORMAL

## 2023-02-14 PROCEDURE — 99214 OFFICE O/P EST MOD 30 MIN: CPT | Mod: TH

## 2023-02-14 PROCEDURE — 81002 URINALYSIS NONAUTO W/O SCOPE: CPT

## 2023-02-14 PROCEDURE — 82962 GLUCOSE BLOOD TEST: CPT

## 2023-02-14 PROCEDURE — 99214 OFFICE O/P EST MOD 30 MIN: CPT

## 2023-02-14 RX ORDER — INSULIN HUMAN 100 [IU]/ML
100 INJECTION, SUSPENSION SUBCUTANEOUS
Qty: 1 | Refills: 5 | Status: DISCONTINUED | COMMUNITY
Start: 2023-02-07 | End: 2023-02-14

## 2023-02-17 DIAGNOSIS — Z3A.01 LESS THAN 8 WEEKS GESTATION OF PREGNANCY: ICD-10-CM

## 2023-02-17 DIAGNOSIS — O24.111 PRE-EXISTING TYPE 2 DIABETES MELLITUS, IN PREGNANCY, FIRST TRIMESTER: ICD-10-CM

## 2023-02-18 ENCOUNTER — NON-APPOINTMENT (OUTPATIENT)
Age: 33
End: 2023-02-18

## 2023-02-18 NOTE — DISCUSSION/SUMMARY
[FreeTextEntry1] : 23\par MFM Att'g f/u Consult Note:\par 32y  at 7w0d by LMP 22, returns for f/u of IDDM2. Referred by Dr. Cason. Denies any abdominal cramping or vaginal bleeding.  \par PMHx: 2020: DM2, Dx'd after her last pregnancy w/GDMA-2. Last Hb A1C on 23 was 8.0%. \par            Denies Chr Htn, Asthma, other illnesses. 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 English proficiency limited. Recent arrival from [place].  Denies substance abuse x3.\par OBhx: \par            2019: , GDMA-2 Rx'd Glyburide. \par                    Postpartum Preeclampsia w/severe features, received magnesium, Labetalol and Nifedipine which she took for 3w then                           D/C'd by her PMD. \par                    Postpartum d3 hemorrhage requiring 5u pRBC, had a "balloon" placed to stop the bleeding.\par GYN: denies\par Vaccinations: Influenza: declines.  Strong recommendation for flu vaccination in pregnancy reviewed. \par                        CoVID: Rec'd J&J in [place].  Declines boosters.    " . \par ROS: As above\par \par Px: Seated comfortably on exam table in NAD. \par       VS:  142/79  ;  168#  BMI 26  rFS 113\par       UDip:  1.020/6.0/Small Ketones/Tr Prot/GluNeg. \par HEENT: NC/AT\par Lungs: Clear \par Abd: Soft, NT.   by US\par Extr: No CCE\par LAB:   Opos/no Antibody screen found;  HbEP Not found. \par 23:  5.8k>12.5/39.3%<410k; mcv 75.3\par \par Impression/Recommendations:\par 31 y/o  at 7w0d, by LMP 22, presents for MFM consult for T2DM. Co-managed with Dr. Cason\par 1. IDDM2:  Patient was  She was previously on Janumet 50/1000mg daily. She discontinued it 17 days ago when she found out she was pregnant and switched to Metformin 1000mg BID with suboptimal glucose control.  I added NPH 20 Units qhs last week when viability of pregnancy was uncertain, anticipating full switch to split dose insulin when FHR could be documented.  Pt had declined making a full switch at that time.   Over the last week, pt has self-increased to 23 Units qhs + Metformiin 1000U bid.  \par FS log (-): FBS: 110-136 (improved but still elevated.)  \par                              2Hr PP: 118-170 ()\par Office FS: 113 \par \par Current	Break	Madonna	Dinner	HS\par NPH	-0-	-0-	-0-	23\par Lispro	-0-	-0-	-0-	\par Metformin	1000mg	-0-	-0-	1000mg\par Calculation of 76kg body weight x 0.6 = 45 units / daily + added NPH predinner dose because pt already taking 23 hs with subopitmal  fasting.  \par ->NewRx:	Break	Madonna	Dinner	HS  Total\par NPH          	20	-0-	25	-0-   65/d\par Lispro	                10	-0-	10	-0-  add hs snack.\par Metformin	                D/Cd	-\par We anticipate that doses will need to be adjusted upward frequently until control is achieved.  These starting doses are low in order to avoid hypoglycemia. We have discussed hospital admission to transition to insulin regimen to obtain tight glucose control vs outpatient transition, knowing that it may take longer. Patient opted for the second option, understanding that hospital admission is what is recommended especially during organogenesis. \par Rx's sent; Diabetes teaching by RN done last week.  f/u needed. \par Telephone consult at 2pm  to adjust doses.  pt cell [phone]. \par \par 2. Pregnancy\par + FHR on ultrasound today. Will need follow up sonogram in 1w to confirm pregnancy.\par - Continue routine prenatal care with Dr. Cason. \par \par RTC MFM one week for MFM and US for dating.\par Telephone consult on  to adjust insulin.\par \par MD Alesia, FACOG

## 2023-02-21 ENCOUNTER — RESULT CHARGE (OUTPATIENT)
Age: 33
End: 2023-02-21

## 2023-02-21 ENCOUNTER — APPOINTMENT (OUTPATIENT)
Dept: ANTEPARTUM | Facility: CLINIC | Age: 33
End: 2023-02-21
Payer: MEDICAID

## 2023-02-21 ENCOUNTER — OUTPATIENT (OUTPATIENT)
Dept: OUTPATIENT SERVICES | Facility: HOSPITAL | Age: 33
LOS: 1 days | End: 2023-02-21
Payer: MEDICAID

## 2023-02-21 VITALS
BODY MASS INDEX: 26.47 KG/M2 | OXYGEN SATURATION: 100 % | HEART RATE: 95 BPM | DIASTOLIC BLOOD PRESSURE: 84 MMHG | TEMPERATURE: 98.1 F | WEIGHT: 169 LBS | SYSTOLIC BLOOD PRESSURE: 147 MMHG

## 2023-02-21 VITALS — SYSTOLIC BLOOD PRESSURE: 140 MMHG | DIASTOLIC BLOOD PRESSURE: 79 MMHG

## 2023-02-21 DIAGNOSIS — O09.90 SUPERVISION OF HIGH RISK PREGNANCY, UNSPECIFIED, UNSPECIFIED TRIMESTER: ICD-10-CM

## 2023-02-21 DIAGNOSIS — Z34.90 ENCOUNTER FOR SUPERVISION OF NORMAL PREGNANCY, UNSPECIFIED, UNSPECIFIED TRIMESTER: ICD-10-CM

## 2023-02-21 LAB
BILIRUB UR QL STRIP: NEGATIVE
CLARITY UR: NORMAL
COLLECTION METHOD: NORMAL
FETAL HEART RATE (BPM): 149
GLUCOSE BLDC GLUCOMTR-MCNC: 97
GLUCOSE BLDC GLUCOMTR-MCNC: 97 MG/DL — SIGNIFICANT CHANGE UP (ref 70–99)
GLUCOSE UR-MCNC: NEGATIVE
HCG UR QL: 0.2 EU/DL
HGB UR QL STRIP.AUTO: NORMAL
KETONES UR-MCNC: NEGATIVE
LEUKOCYTE ESTERASE UR QL STRIP: NORMAL
NITRITE UR QL STRIP: NEGATIVE
OB COMMENTS: NORMAL
PH UR STRIP: 6.5
PROT UR STRIP-MCNC: NEGATIVE
SCHEDULED VISIT: YES
SP GR UR STRIP: 1.01
URINE ALBUMIN/PROTEIN: NEGATIVE
URINE GLUCOSE: NEGATIVE
URINE KETONES: NEGATIVE
WEEKS GESTATION: 8

## 2023-02-21 PROCEDURE — 82962 GLUCOSE BLOOD TEST: CPT

## 2023-02-21 PROCEDURE — 99215 OFFICE O/P EST HI 40 MIN: CPT

## 2023-02-21 PROCEDURE — 81002 URINALYSIS NONAUTO W/O SCOPE: CPT

## 2023-02-21 PROCEDURE — 99215 OFFICE O/P EST HI 40 MIN: CPT | Mod: TH

## 2023-02-21 PROCEDURE — 82948 REAGENT STRIP/BLOOD GLUCOSE: CPT

## 2023-02-21 NOTE — DISCUSSION/SUMMARY
[FreeTextEntry1] : 23\par MFM Att'g f/u Consult Note:\par 32y  at 8w0d by LMP 22, returns for f/u of IDDM2. Referred by Dr. Cason & Luisa. Denies any abdominal cramping or vaginal bleeding.  No hypoglycemia sx, no polyuria. \par PMHx: 2020: DM2, Dx'd after her last pregnancy w/GDMA-2. Last Hb A1C on 23 was 8.0%. \par            PP Preecl (severe), possible Chr Htn. \par            Denies Asthma, other illnesses. \par Surg: denies\par Meds: Metformin 1000mg BID started 23 d/c'd .\par           Janumet 50/1000mg daily d/c'd 23\par           Insulin: NPH and Lispro  - see dosing below. \par           2019: Labetalol/Nifedipine continued 1-2mo PP. 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  Denies substance abuse x3.\par OBhx: \par            2019: , GDMA-2. 10# female. Rx'd Glyburide.  Discharged home PPD#10.\par                    Postpartum Preeclampsia w/severe features, received magnesium, Labetalol and Nifedipine which she took for 1-2mo then                           D/C'd by her PMD. \par                    Postpartum hemorrhage x2 requiring 5u pRBC, had a "balloon" placed to stop the bleeding.\par GYN: denies\par Vaccinations: Influenza: declines.  Strong recommendation for flu vaccination in pregnancy reviewed. \par                        CoVID: Rec'd J&J in [place].  Declines boosters.    " . \par ROS: As above\par \par Px: Seated comfortably on exam table in NAD. \par       VS:  1427/84>140/79  HR 95;  169#  BMI 26  rFS 97 1.5h pc.\par       UDip:  1.010/6.5/Glu/ket/Prot    } Neg.  Small Blood, mod LE, Nitrite neg\par HEENT: NC/AT\par Lungs: Clear \par Abd: Soft, NT.   by US\par Extr: No CCE\par LAB:   Opos/no Antibody screen found;  HbEP Not found. \par 23:  5.8k>12.5/39.3%<410k; mcv 75.3\par  Prenatal labs ordered. \par \par Impression/Recommendations:\par 33 y/o  at 7w0d, by LMP 22, presents for MFM consult for T2DM. Co-managed with Dr. Cason\par 1. IDDM2:  Patient was  She was previously on Janumet 50/1000mg daily. She discontinued it 17 days ago when she found out she was pregnant and switched to Metformin 1000mg BID with suboptimal glucose control.  I added NPH 20 Units qhs last week when viability of pregnancy was uncertain, anticipating full switch to split dose insulin when FHR could be documented.  Pt had declined making a full switch at that time.   Over the last week, pt has self-increased to 23 Units qhs + Metformiin 1000U bid.  \par FS log (-): FBS: 86-93  \par                                2Hr PP:  \par Office FS: 96 \par Current	Break	Madonna	Dinner	HS\par NPH	18	-0-	28	-0-\par Lispro	  9	-0-	  9	-0-\par \par ->NewRx:  No changes. We anticipate that doses will need to be adjusted upward as normal pregnancy progresses.\par ->Diabetes teaching by RN done; f/u needed. \par Telephone consult on Sat  to adjust doses.  pt cell [phone]. \par \par 2. Pregnancy\par + FHR on ultrasound today. Will need follow up sonogram in 2w to confirm dating.  Order placed. \par - Coordinate routine prenatal care with Dr. Cason. \par \par 3. Borderline incr bp in office only.  BP at home normal. \par -->Rx bp cuff, log given for daily bp measurement.\par -->Consider antihypertensive Rx.\par \par RTC MFM 2w for MFM and US for dating.\par Telephone consult on  to adjust insulin.\par \par MD Alesia, FACOG

## 2023-02-22 ENCOUNTER — APPOINTMENT (OUTPATIENT)
Age: 33
End: 2023-02-22

## 2023-02-27 ENCOUNTER — TRANSCRIPTION ENCOUNTER (OUTPATIENT)
Age: 33
End: 2023-02-27

## 2023-02-27 DIAGNOSIS — Z87.59 PERSONAL HISTORY OF OTHER COMPLICATIONS OF PREGNANCY, CHILDBIRTH AND THE PUERPERIUM: ICD-10-CM

## 2023-02-27 DIAGNOSIS — Z3A.08 8 WEEKS GESTATION OF PREGNANCY: ICD-10-CM

## 2023-02-27 DIAGNOSIS — O10.919 UNSPECIFIED PRE-EXISTING HYPERTENSION COMPLICATING PREGNANCY, UNSPECIFIED TRIMESTER: ICD-10-CM

## 2023-02-27 DIAGNOSIS — O24.111 PRE-EXISTING TYPE 2 DIABETES MELLITUS, IN PREGNANCY, FIRST TRIMESTER: ICD-10-CM

## 2023-02-27 LAB
ABO + RH PNL BLD: NORMAL
ALBUMIN SERPL ELPH-MCNC: 4.7 G/DL
ALP BLD-CCNC: 70 U/L
ALT SERPL-CCNC: 10 U/L
ANION GAP SERPL CALC-SCNC: 14 MMOL/L
AST SERPL-CCNC: 10 U/L
BACTERIA UR CULT: NORMAL
BASOPHILS # BLD AUTO: 0.03 K/UL
BASOPHILS NFR BLD AUTO: 0.6 %
BILIRUB SERPL-MCNC: 0.4 MG/DL
BLD GP AB SCN SERPL QL: NORMAL
BUN SERPL-MCNC: <5 MG/DL
CALCIUM SERPL-MCNC: 9 MG/DL
CHLORIDE SERPL-SCNC: 99 MMOL/L
CO2 SERPL-SCNC: 23 MMOL/L
CREAT SERPL-MCNC: 0.6 MG/DL
CREAT SPEC-SCNC: 120 MG/DL
CREAT/PROT UR: 0.1 RATIO
EGFR: 124 ML/MIN/1.73M2
EOSINOPHIL # BLD AUTO: 0.15 K/UL
EOSINOPHIL NFR BLD AUTO: 2.9 %
FERRITIN SERPL-MCNC: 22 NG/ML
FOLATE SERPL-MCNC: >20 NG/ML
GLUCOSE SERPL-MCNC: 72 MG/DL
HBV SURFACE AB SER QL: REACTIVE
HBV SURFACE AG SER QL: NONREACTIVE
HCT VFR BLD CALC: 40.1 %
HCV AB SER QL: NONREACTIVE
HCV S/CO RATIO: 0.14 S/CO
HGB BLD-MCNC: 12.5 G/DL
HIV1+2 AB SPEC QL IA.RAPID: NONREACTIVE
IMM GRANULOCYTES NFR BLD AUTO: 0.6 %
IRON SATN MFR SERPL: 14 %
IRON SERPL-MCNC: 55 UG/DL
LYMPHOCYTES # BLD AUTO: 1.73 K/UL
LYMPHOCYTES NFR BLD AUTO: 33.7 %
MAN DIFF?: NORMAL
MCHC RBC-ENTMCNC: 23.7 PG
MCHC RBC-ENTMCNC: 31.2 G/DL
MCV RBC AUTO: 76.1 FL
MEV IGG FLD QL IA: 141 AU/ML
MEV IGG+IGM SER-IMP: POSITIVE
MONOCYTES # BLD AUTO: 0.6 K/UL
MONOCYTES NFR BLD AUTO: 11.7 %
NEUTROPHILS # BLD AUTO: 2.59 K/UL
NEUTROPHILS NFR BLD AUTO: 50.5 %
PLATELET # BLD AUTO: 349 K/UL
POTASSIUM SERPL-SCNC: 3.7 MMOL/L
PROT SERPL-MCNC: 7.3 G/DL
PROT UR-MCNC: 15 MG/DLG/24H
RBC # BLD: 5.27 M/UL
RBC # FLD: 15.9 %
RUBV IGG FLD-ACNC: 8.8 INDEX
RUBV IGG SER-IMP: POSITIVE
SODIUM SERPL-SCNC: 136 MMOL/L
T PALLIDUM AB SER QL IA: NEGATIVE
TIBC SERPL-MCNC: 401 UG/DL
TSH SERPL-ACNC: 3.07 UIU/ML
UIBC SERPL-MCNC: 346 UG/DL
URATE UR-MCNC: 73.8 MG/DL
VIT B12 SERPL-MCNC: 452 PG/ML
WBC # FLD AUTO: 5.13 K/UL

## 2023-03-01 LAB
HGB A MFR BLD: 97.7 %
HGB A2 MFR BLD: 2.3 %
HGB FRACT BLD-IMP: NORMAL
HIV1 RNA # SERPL NAA+PROBE: NOT DETECTED COPIES/ML
VIRAL LOAD INTERP: NOT DETECTED COPIES/ML
VIRAL LOAD LOG: NOT DETECTED LOGCOPIES/ML

## 2023-03-07 ENCOUNTER — ASOB RESULT (OUTPATIENT)
Age: 33
End: 2023-03-07

## 2023-03-07 ENCOUNTER — APPOINTMENT (OUTPATIENT)
Dept: ANTEPARTUM | Facility: CLINIC | Age: 33
End: 2023-03-07
Payer: MEDICAID

## 2023-03-07 ENCOUNTER — OUTPATIENT (OUTPATIENT)
Dept: OUTPATIENT SERVICES | Facility: HOSPITAL | Age: 33
LOS: 1 days | End: 2023-03-07
Payer: MEDICAID

## 2023-03-07 VITALS
WEIGHT: 169 LBS | SYSTOLIC BLOOD PRESSURE: 134 MMHG | BODY MASS INDEX: 26.47 KG/M2 | HEART RATE: 95 BPM | OXYGEN SATURATION: 100 % | TEMPERATURE: 98.4 F | DIASTOLIC BLOOD PRESSURE: 65 MMHG

## 2023-03-07 DIAGNOSIS — Z34.90 ENCOUNTER FOR SUPERVISION OF NORMAL PREGNANCY, UNSPECIFIED, UNSPECIFIED TRIMESTER: ICD-10-CM

## 2023-03-07 DIAGNOSIS — O09.90 SUPERVISION OF HIGH RISK PREGNANCY, UNSPECIFIED, UNSPECIFIED TRIMESTER: ICD-10-CM

## 2023-03-07 LAB
ALPHA - GLOBIN COMMON MUTATION RESULT: NORMAL
AR GENE MUT ANL BLD/T: NORMAL
FETAL HEART RATE (BPM): 157
M TB IFN-G BLD-IMP: NEGATIVE
OB COMMENTS: NORMAL
QUANTIFERON TB PLUS MITOGEN MINUS NIL: 9.51 IU/ML
QUANTIFERON TB PLUS NIL: 0.03 IU/ML
QUANTIFERON TB PLUS TB1 MINUS NIL: -0.01 IU/ML
QUANTIFERON TB PLUS TB2 MINUS NIL: -0.01 IU/ML
SCHEDULED VISIT: YES
URINE ALBUMIN/PROTEIN: NORMAL
URINE GLUCOSE: NORMAL
URINE KETONES: NORMAL
WEEKS GESTATION: 10

## 2023-03-07 PROCEDURE — ZZZZZ: CPT

## 2023-03-07 PROCEDURE — 76801 OB US < 14 WKS SINGLE FETUS: CPT | Mod: 26

## 2023-03-07 PROCEDURE — 82962 GLUCOSE BLOOD TEST: CPT

## 2023-03-07 PROCEDURE — 76801 OB US < 14 WKS SINGLE FETUS: CPT

## 2023-03-07 PROCEDURE — 99213 OFFICE O/P EST LOW 20 MIN: CPT

## 2023-03-07 PROCEDURE — 81002 URINALYSIS NONAUTO W/O SCOPE: CPT

## 2023-03-07 PROCEDURE — 82948 REAGENT STRIP/BLOOD GLUCOSE: CPT

## 2023-03-08 LAB — GLUCOSE BLDC GLUCOMTR-MCNC: 74 MG/DL — SIGNIFICANT CHANGE UP (ref 70–99)

## 2023-03-14 DIAGNOSIS — O36.80X0 PREGNANCY WITH INCONCLUSIVE FETAL VIABILITY, NOT APPLICABLE OR UNSPECIFIED: ICD-10-CM

## 2023-03-14 DIAGNOSIS — O24.311 UNSPECIFIED PRE-EXISTING DIABETES MELLITUS IN PREGNANCY, FIRST TRIMESTER: ICD-10-CM

## 2023-03-14 DIAGNOSIS — Z87.59 PERSONAL HISTORY OF OTHER COMPLICATIONS OF PREGNANCY, CHILDBIRTH AND THE PUERPERIUM: ICD-10-CM

## 2023-03-14 DIAGNOSIS — Z3A.10 10 WEEKS GESTATION OF PREGNANCY: ICD-10-CM

## 2023-03-14 DIAGNOSIS — O24.111 PRE-EXISTING TYPE 2 DIABETES MELLITUS, IN PREGNANCY, FIRST TRIMESTER: ICD-10-CM

## 2023-03-22 ENCOUNTER — NON-APPOINTMENT (OUTPATIENT)
Age: 33
End: 2023-03-22

## 2023-03-22 RX ORDER — PEN NEEDLE, DIABETIC 31 GX5/16"
NEEDLE, DISPOSABLE MISCELLANEOUS
Qty: 150 | Refills: 11 | Status: ACTIVE | COMMUNITY
Start: 2023-03-22 | End: 1900-01-01

## 2023-03-22 NOTE — DISCUSSION/SUMMARY
[FreeTextEntry1] : 3/7/23\par MFM Att'g f/u Consult Note:  Pt arrived 1.5h late to visit.  Consultation performed via telehealth after VS and f/u US.\par 32y  at 10w0d by LMP 22, returns for f/u of IDDM2. Referred by Dr. Cason & Luisa. Denies any abdominal cramping or vaginal bleeding.  No hypoglycemia sx, no polyuria. \par PMHx: 2020: DM2, Dx'd after her last pregnancy w/GDMA-2. Last Hb A1C on 23 was 8.0%. \par            PP Preecl (severe), possible Chr Htn. \par            Denies Asthma, other illnesses. \par Surg: denies\par Meds: Metformin 1000mg BID started 23 d/c'd .\par           Janumet 50/1000mg daily d/c'd 23\par           Insulin: NPH and Lispro  - see dosing below. \par           2019: Labetalol/Nifedipine continued 1-2mo PP. 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 English proficiency limited. Recent arrival from [place].  Denies substance abuse x3.\par OBhx: \par            2019: , GDMA-2. 10# female. Rx'd Glyburide.  Discharged home PPD#10.\par                    Postpartum Preeclampsia w/severe features, received magnesium, Labetalol and Nifedipine which she took for 1-2mo then                           D/C'd by her PMD. \par                    Postpartum hemorrhage x2 requiring 5u pRBC, had a "balloon" placed to stop the bleeding.\par GYN: denies\par Vaccinations: Influenza: declines.  Strong recommendation for flu vaccination in pregnancy reviewed. \par                        CoVID: Rec'd J&J in [place].  Declines boosters.    " . \par ROS: As above\par \par Px: Seated comfortably on exam table in NAD. \par       VS:  142/84>140/79  HR 95;  169#  BMI 26  rFS 97 1.5h pc.\par       UDip:  1.010/6.5/Glu/ket/Prot      } Neg.\par HEENT: NC/AT\par Lungs: Clear \par Abd: Soft, NT.   by US\par Extr: No CCE\par LAB:   Opos/AntibNEG;  HbEP Not found. \par 23:  5.8k>12.5/39.3%<410k; mcv 75.3\par : TSH 3.1;  Fe Sat 14% (decr) labs o/w normal. TIBC 14 (decr).  Upr/Cr 0.1\par          Quantiferon neg;  SMA: Possible silent carrier.; alpha thal: aa/a-: silent carrier. \par \par  Prenatal labs ordered. \par \par OBUS: \par 3/7/23: Single IUP at 10w0d by LMP, ant plac. CRL 9w4d, normal growth.\par   \par Impression/Recommendations:\par 31 y/o  at 7w0d, by LMP 22, presents for MFM consult for T2DM. Co-managed with Dr. Cason\par 1. IDDM2:  Patient was previously on Janumet 50/1000mg daily. She discontinued it when she found out she was pregnant and switched to Metformin 1000mg BID with suboptimal glucose control.  We initially added NPH 20 Units qhs last week when viability of pregnancy was uncertain the switched completely to split dose insulin below.  \par FS log (-3/6): FBS: 81-94  \par                                2Hr PP:   \par Current	Break	Madonna	Dinner	HS\par NPH__	18	-0-	28	-0-\par Lispro	  9	-0-	  9	-0-\par \par ->Diabetes teaching by RN done; f/u needed.  pt cell [phone]. \par ->Excellent control.  No changes in doses needed. \par \par 2. Pregnancy\par Normal growth on US today. \par - Coordinate routine prenatal care with Dr. Cason. \par \par 3. Hx severe preeclampia.  Borderline incr bp last visit.  BP at home also normal. \par -->Rx bp cuff, log given for daily bp measurement.\par -->No antihypertensives needed now. \par -->Begin ASA 81mg at 12-16w and discontinue at 36w per USPHS guidelines. \par \par RTC MFM 2w for MFM and US for NT.\par Telephone consult on 3/7 to adjust insulin.\par \par MD Alesia, FACOG

## 2023-03-28 ENCOUNTER — NON-APPOINTMENT (OUTPATIENT)
Age: 33
End: 2023-03-28

## 2023-03-28 ENCOUNTER — OUTPATIENT (OUTPATIENT)
Dept: OUTPATIENT SERVICES | Facility: HOSPITAL | Age: 33
LOS: 1 days | End: 2023-03-28
Payer: MEDICAID

## 2023-03-28 ENCOUNTER — APPOINTMENT (OUTPATIENT)
Dept: ANTEPARTUM | Facility: CLINIC | Age: 33
End: 2023-03-28
Payer: MEDICAID

## 2023-03-28 ENCOUNTER — ASOB RESULT (OUTPATIENT)
Age: 33
End: 2023-03-28

## 2023-03-28 ENCOUNTER — RESULT CHARGE (OUTPATIENT)
Age: 33
End: 2023-03-28

## 2023-03-28 VITALS
WEIGHT: 171 LBS | BODY MASS INDEX: 26.78 KG/M2 | SYSTOLIC BLOOD PRESSURE: 119 MMHG | TEMPERATURE: 98.2 F | DIASTOLIC BLOOD PRESSURE: 59 MMHG | HEART RATE: 100 BPM | OXYGEN SATURATION: 100 %

## 2023-03-28 DIAGNOSIS — Z34.90 ENCOUNTER FOR SUPERVISION OF NORMAL PREGNANCY, UNSPECIFIED, UNSPECIFIED TRIMESTER: ICD-10-CM

## 2023-03-28 DIAGNOSIS — O09.90 SUPERVISION OF HIGH RISK PREGNANCY, UNSPECIFIED, UNSPECIFIED TRIMESTER: ICD-10-CM

## 2023-03-28 LAB — GLUCOSE BLDC GLUCOMTR-MCNC: 75 MG/DL — SIGNIFICANT CHANGE UP (ref 70–99)

## 2023-03-28 PROCEDURE — 76813 OB US NUCHAL MEAS 1 GEST: CPT | Mod: 26

## 2023-03-28 PROCEDURE — 82962 GLUCOSE BLOOD TEST: CPT

## 2023-03-28 PROCEDURE — 82948 REAGENT STRIP/BLOOD GLUCOSE: CPT

## 2023-03-28 PROCEDURE — 99214 OFFICE O/P EST MOD 30 MIN: CPT | Mod: TH,25

## 2023-03-28 PROCEDURE — 99214 OFFICE O/P EST MOD 30 MIN: CPT

## 2023-03-28 PROCEDURE — 81002 URINALYSIS NONAUTO W/O SCOPE: CPT

## 2023-03-28 PROCEDURE — 76813 OB US NUCHAL MEAS 1 GEST: CPT

## 2023-03-28 RX ORDER — PSEUDOEPHEDRINE HCL 30 MG
27-0.8 TABLET ORAL DAILY
Qty: 30 | Refills: 9 | Status: ACTIVE | COMMUNITY
Start: 2023-03-28 | End: 1900-01-01

## 2023-03-28 NOTE — DISCUSSION/SUMMARY
[FreeTextEntry1] : 3/28/23\par MFM Att'g & PGY-3 f/u Consult Note:\par 32y  at 13w0d by LMP 22, EDUARDO 10/3/2023, returns for f/u of IDDM2. Referred by Dr. Cason & Luisa. Denies any abdominal cramping or vaginal bleeding. No hypoglycemia sx, no polyuria.  \par \par PMHx: 2020: DM2, Dx'd after her last pregnancy w/GDMA-2. Last Hb A1C on 23 was 8.0%.  \par                      PP Preecl (severe), possible Chr Htn.  \par            Denies Asthma, other illnesses.  \par Surg: denies \par Meds: Metformin 1000mg BID started 23 d/c'd . \par Janumet 50/1000mg daily d/c'd 23 \par Insulin: NPH and Lispro - see dosing below.  \par 2019: Labetalol/Nifedipine continued 1-2mo PP.  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 English proficiency limited. Recent arrival from [place]. Denies substance abuse x3. \par OBhx:  \par 2019: , GDMA-2. 10# female. Rx'd Glyburide. Discharged home PPD#10. \par           Postpartum Preeclampsia w/severe features, received magnesium, Labetalol and Nifedipine which she took for 1-2mo then  D/C'd by her PMD.  \par           Postpartum hemorrhage x2 requiring 5u pRBC, had a "balloon" placed to stop the bleeding. \par GYN: denies \par Vaccinations: Influenza: declines. Strong recommendation for flu vaccination in pregnancy reviewed.  \par                        CoVID: Rec'd J&J in [place]. Declines boosters. " .  \par ROS:  As above \par \par Px: Appears very happy today. \par VS: /59, T 98.2 F, , Wt 171lbs, BMI 26.78, O2 sat 100%\par Udip: neg \par \par HEENT: NC/AT \par Lungs: Clear  \par Abd: Soft, NT.  by US \par Extr: No CCE \par ---------------------\par \par LAB: Opos/AntibNEG; HbEP Not found.  \par 23: 5.8k>12.5/39.3%<410k; mcv 75.3 \par : TSH 3.1; Fe Sat 14% (decr) labs o/w normal. TIBC 14 (decr). Upr/Cr 0.1 \par Quantiferon neg; SMA: Possible silent carrier.; alpha thal: aa/a-: silent carrier.  \par \par OBUS:  \par 3/7/23: Single IUP at 10w0d by LMP, ant plac. CRL 9w4d, normal growth. \par 3/28/23: Single IUP at 13w0d, CRL 12w4d.  NT 0.9mm normal\par \par Impression/Recommendations: \par 32y  at 13w0d, by LMP 22, presents for MFM consult for T2DM. \par \par 1. IDDM2: Patient was previously on Janumet 50/1000mg daily. She discontinued it when she found out she was pregnant and switched to Metformin 1000mg BID with suboptimal glucose control. We initially added NPH 20 Units qhs last week when viability of pregnancy was uncertain the switched completely to split dose insulin below.  \par FS log (3/17-3/28): Fasting FS 72-95 (2/12 values elevated), 2hr PP  (0/21 values elevated)\par Current Insulin: Break Madonna Dinner HS \par    NPH 18ac breakfast;  28 ac supper \par    Lispro    9ac breakfast;  9 ac supper;  Small bedtime snack as needed.  \par - No values for 2hr post dinner. Patient reports that she sometimes only has a light snack for dinner or she falls asleep after eating before taking FS. Discussed taking FS 1hr PP to help with logging postprandial values. \par ->Diabetes teaching by RN done; f/u needed. pt cell [phone].  \par ->Excellent control. No changes in doses needed.  \par - Declined nutritionist consult at this time and will consider it for next visit. \par \par 2. Pregnancy \par - NT sono today, f/u report. \par - Discussed genetic testing for silent alpha thalassemia carrier status. Patient and  will want to discuss first. \par - Declined NIPT at this time, will want to discuss with partner first and think about it. \par - Declines genetic counseling for alpha thal silent carrier. \par - Continue with PNV. \par - Will establish care with OB at [facility] for convenience of visits.\par \par 3. Hx severe preeclampia. Borderline incr bp last visit. BP at home also normal.  \par -->Rx bp cuff, log given for daily bp measurement. Did not bring log today. BP today 119/59. \par -->No antihypertensives needed now.  \par -->Begin ASA 81mg at 12-16w and discontinue at 36w per USPHS guidelines.  \par \par RTC in 2w \par \par MD Alesia, FACOG with MD Brandi, PGY-3

## 2023-03-30 DIAGNOSIS — O24.111 PRE-EXISTING TYPE 2 DIABETES MELLITUS, IN PREGNANCY, FIRST TRIMESTER: ICD-10-CM

## 2023-03-30 DIAGNOSIS — O10.911 UNSPECIFIED PRE-EXISTING HYPERTENSION COMPLICATING PREGNANCY, FIRST TRIMESTER: ICD-10-CM

## 2023-03-30 DIAGNOSIS — O10.919 UNSPECIFIED PRE-EXISTING HYPERTENSION COMPLICATING PREGNANCY, UNSPECIFIED TRIMESTER: ICD-10-CM

## 2023-03-30 DIAGNOSIS — O09.91 SUPERVISION OF HIGH RISK PREGNANCY, UNSPECIFIED, FIRST TRIMESTER: ICD-10-CM

## 2023-03-30 DIAGNOSIS — Z36.82 ENCOUNTER FOR ANTENATAL SCREENING FOR NUCHAL TRANSLUCENCY: ICD-10-CM

## 2023-03-30 DIAGNOSIS — Z87.59 PERSONAL HISTORY OF OTHER COMPLICATIONS OF PREGNANCY, CHILDBIRTH AND THE PUERPERIUM: ICD-10-CM

## 2023-03-30 DIAGNOSIS — Z3A.13 13 WEEKS GESTATION OF PREGNANCY: ICD-10-CM

## 2023-04-12 DIAGNOSIS — O10.919 UNSPECIFIED PRE-EXISTING HYPERTENSION COMPLICATING PREGNANCY, UNSPECIFIED TRIMESTER: ICD-10-CM

## 2023-04-12 DIAGNOSIS — Z87.59 PERSONAL HISTORY OF OTHER COMPLICATIONS OF PREGNANCY, CHILDBIRTH AND THE PUERPERIUM: ICD-10-CM

## 2023-04-12 DIAGNOSIS — Z3A.13 13 WEEKS GESTATION OF PREGNANCY: ICD-10-CM

## 2023-04-12 DIAGNOSIS — O24.111 PRE-EXISTING TYPE 2 DIABETES MELLITUS, IN PREGNANCY, FIRST TRIMESTER: ICD-10-CM

## 2023-04-20 ENCOUNTER — APPOINTMENT (OUTPATIENT)
Dept: ANTEPARTUM | Facility: CLINIC | Age: 33
End: 2023-04-20
Payer: MEDICAID

## 2023-04-20 ENCOUNTER — ASOB RESULT (OUTPATIENT)
Age: 33
End: 2023-04-20

## 2023-04-20 ENCOUNTER — APPOINTMENT (OUTPATIENT)
Dept: ANTEPARTUM | Facility: CLINIC | Age: 33
End: 2023-04-20

## 2023-04-20 ENCOUNTER — OUTPATIENT (OUTPATIENT)
Dept: OUTPATIENT SERVICES | Facility: HOSPITAL | Age: 33
LOS: 1 days | End: 2023-04-20
Payer: MEDICAID

## 2023-04-20 ENCOUNTER — RESULT CHARGE (OUTPATIENT)
Age: 33
End: 2023-04-20

## 2023-04-20 VITALS
WEIGHT: 175 LBS | TEMPERATURE: 98.6 F | HEART RATE: 101 BPM | BODY MASS INDEX: 27.41 KG/M2 | OXYGEN SATURATION: 100 % | DIASTOLIC BLOOD PRESSURE: 63 MMHG | SYSTOLIC BLOOD PRESSURE: 127 MMHG

## 2023-04-20 DIAGNOSIS — E11.9 TYPE 2 DIABETES MELLITUS WITHOUT COMPLICATIONS: ICD-10-CM

## 2023-04-20 DIAGNOSIS — Z34.90 ENCOUNTER FOR SUPERVISION OF NORMAL PREGNANCY, UNSPECIFIED, UNSPECIFIED TRIMESTER: ICD-10-CM

## 2023-04-20 LAB — GLUCOSE BLDC GLUCOMTR-MCNC: 80 MG/DL — SIGNIFICANT CHANGE UP (ref 70–99)

## 2023-04-20 PROCEDURE — 83036 HEMOGLOBIN GLYCOSYLATED A1C: CPT

## 2023-04-20 PROCEDURE — 76805 OB US >/= 14 WKS SNGL FETUS: CPT | Mod: 26

## 2023-04-20 PROCEDURE — 82948 REAGENT STRIP/BLOOD GLUCOSE: CPT

## 2023-04-20 PROCEDURE — 76805 OB US >/= 14 WKS SNGL FETUS: CPT

## 2023-04-20 PROCEDURE — 99213 OFFICE O/P EST LOW 20 MIN: CPT | Mod: TH,25

## 2023-04-20 PROCEDURE — 82105 ALPHA-FETOPROTEIN SERUM: CPT

## 2023-04-20 PROCEDURE — G0108: CPT

## 2023-04-20 PROCEDURE — 36415 COLL VENOUS BLD VENIPUNCTURE: CPT

## 2023-04-20 PROCEDURE — 81002 URINALYSIS NONAUTO W/O SCOPE: CPT

## 2023-04-20 PROCEDURE — 82962 GLUCOSE BLOOD TEST: CPT

## 2023-04-25 DIAGNOSIS — O24.812: ICD-10-CM

## 2023-04-25 DIAGNOSIS — O24.111 PRE-EXISTING TYPE 2 DIABETES MELLITUS, IN PREGNANCY, FIRST TRIMESTER: ICD-10-CM

## 2023-04-25 DIAGNOSIS — Z36.3 ENCOUNTER FOR ANTENATAL SCREENING FOR MALFORMATIONS: ICD-10-CM

## 2023-04-25 DIAGNOSIS — Z3A.16 16 WEEKS GESTATION OF PREGNANCY: ICD-10-CM

## 2023-05-08 ENCOUNTER — OUTPATIENT (OUTPATIENT)
Dept: OUTPATIENT SERVICES | Facility: HOSPITAL | Age: 33
LOS: 1 days | End: 2023-05-08
Payer: MEDICAID

## 2023-05-08 ENCOUNTER — RESULT CHARGE (OUTPATIENT)
Age: 33
End: 2023-05-08

## 2023-05-08 ENCOUNTER — APPOINTMENT (OUTPATIENT)
Dept: OBGYN | Facility: CLINIC | Age: 33
End: 2023-05-08
Payer: MEDICAID

## 2023-05-08 VITALS — DIASTOLIC BLOOD PRESSURE: 88 MMHG | SYSTOLIC BLOOD PRESSURE: 150 MMHG

## 2023-05-08 VITALS — BODY MASS INDEX: 27.72 KG/M2 | WEIGHT: 177 LBS

## 2023-05-08 VITALS — SYSTOLIC BLOOD PRESSURE: 136 MMHG | DIASTOLIC BLOOD PRESSURE: 84 MMHG

## 2023-05-08 DIAGNOSIS — Z34.90 ENCOUNTER FOR SUPERVISION OF NORMAL PREGNANCY, UNSPECIFIED, UNSPECIFIED TRIMESTER: ICD-10-CM

## 2023-05-08 LAB
BILIRUB UR QL STRIP: NORMAL
CLARITY UR: CLEAR
COLLECTION METHOD: NORMAL
GLUCOSE UR-MCNC: NORMAL
HCG UR QL: 0.2 EU/DL
HGB UR QL STRIP.AUTO: NORMAL
KETONES UR-MCNC: NORMAL
LEUKOCYTE ESTERASE UR QL STRIP: NORMAL
NITRITE UR QL STRIP: NORMAL
PH UR STRIP: 6
PROT UR STRIP-MCNC: NORMAL
SP GR UR STRIP: 1.02

## 2023-05-08 PROCEDURE — 99214 OFFICE O/P EST MOD 30 MIN: CPT | Mod: TH

## 2023-05-08 PROCEDURE — 81002 URINALYSIS NONAUTO W/O SCOPE: CPT

## 2023-05-08 PROCEDURE — 99214 OFFICE O/P EST MOD 30 MIN: CPT

## 2023-05-10 DIAGNOSIS — O24.912 UNSPECIFIED DIABETES MELLITUS IN PREGNANCY, SECOND TRIMESTER: ICD-10-CM

## 2023-05-15 ENCOUNTER — NON-APPOINTMENT (OUTPATIENT)
Age: 33
End: 2023-05-15

## 2023-05-16 ENCOUNTER — ASOB RESULT (OUTPATIENT)
Age: 33
End: 2023-05-16

## 2023-05-16 ENCOUNTER — OUTPATIENT (OUTPATIENT)
Dept: OUTPATIENT SERVICES | Facility: HOSPITAL | Age: 33
LOS: 1 days | End: 2023-05-16
Payer: MEDICAID

## 2023-05-16 ENCOUNTER — APPOINTMENT (OUTPATIENT)
Dept: ANTEPARTUM | Facility: CLINIC | Age: 33
End: 2023-05-16
Payer: MEDICAID

## 2023-05-16 ENCOUNTER — RESULT CHARGE (OUTPATIENT)
Age: 33
End: 2023-05-16

## 2023-05-16 VITALS
OXYGEN SATURATION: 99 % | DIASTOLIC BLOOD PRESSURE: 83 MMHG | TEMPERATURE: 98.4 F | SYSTOLIC BLOOD PRESSURE: 138 MMHG | BODY MASS INDEX: 27.72 KG/M2 | WEIGHT: 177 LBS

## 2023-05-16 VITALS — HEART RATE: 92 BPM

## 2023-05-16 DIAGNOSIS — Z34.90 ENCOUNTER FOR SUPERVISION OF NORMAL PREGNANCY, UNSPECIFIED, UNSPECIFIED TRIMESTER: ICD-10-CM

## 2023-05-16 LAB — GLUCOSE BLDC GLUCOMTR-MCNC: 93 MG/DL — SIGNIFICANT CHANGE UP (ref 70–99)

## 2023-05-16 PROCEDURE — 81002 URINALYSIS NONAUTO W/O SCOPE: CPT

## 2023-05-16 PROCEDURE — 76811 OB US DETAILED SNGL FETUS: CPT | Mod: 26

## 2023-05-16 PROCEDURE — 99213 OFFICE O/P EST LOW 20 MIN: CPT | Mod: TH,25

## 2023-05-16 PROCEDURE — 82948 REAGENT STRIP/BLOOD GLUCOSE: CPT

## 2023-05-16 PROCEDURE — 82962 GLUCOSE BLOOD TEST: CPT

## 2023-05-16 PROCEDURE — 76811 OB US DETAILED SNGL FETUS: CPT

## 2023-05-16 PROCEDURE — 99213 OFFICE O/P EST LOW 20 MIN: CPT

## 2023-05-19 DIAGNOSIS — O35.8XX0 MATERNAL CARE FOR OTHER (SUSPECTED) FETAL ABNORMALITY AND DAMAGE, NOT APPLICABLE OR UNSPECIFIED: ICD-10-CM

## 2023-05-19 DIAGNOSIS — Z3A.20 20 WEEKS GESTATION OF PREGNANCY: ICD-10-CM

## 2023-05-19 DIAGNOSIS — O10.912 UNSPECIFIED PRE-EXISTING HYPERTENSION COMPLICATING PREGNANCY, SECOND TRIMESTER: ICD-10-CM

## 2023-05-19 DIAGNOSIS — O24.019 PRE-EXISTING TYPE 1 DIABETES MELLITUS, IN PREGNANCY, UNSPECIFIED TRIMESTER: ICD-10-CM

## 2023-05-22 ENCOUNTER — NON-APPOINTMENT (OUTPATIENT)
Age: 33
End: 2023-05-22

## 2023-05-26 ENCOUNTER — APPOINTMENT (OUTPATIENT)
Dept: ANTEPARTUM | Facility: CLINIC | Age: 33
End: 2023-05-26

## 2023-06-05 ENCOUNTER — APPOINTMENT (OUTPATIENT)
Dept: ANTEPARTUM | Facility: CLINIC | Age: 33
End: 2023-06-05
Payer: MEDICAID

## 2023-06-05 ENCOUNTER — APPOINTMENT (OUTPATIENT)
Dept: OBGYN | Facility: CLINIC | Age: 33
End: 2023-06-05
Payer: MEDICAID

## 2023-06-05 ENCOUNTER — RESULT CHARGE (OUTPATIENT)
Age: 33
End: 2023-06-05

## 2023-06-05 ENCOUNTER — OUTPATIENT (OUTPATIENT)
Dept: OUTPATIENT SERVICES | Facility: HOSPITAL | Age: 33
LOS: 1 days | End: 2023-06-05
Payer: MEDICAID

## 2023-06-05 VITALS — TEMPERATURE: 98.4 F | HEART RATE: 80 BPM | OXYGEN SATURATION: 98 %

## 2023-06-05 VITALS — DIASTOLIC BLOOD PRESSURE: 80 MMHG | WEIGHT: 180 LBS | BODY MASS INDEX: 28.19 KG/M2 | SYSTOLIC BLOOD PRESSURE: 130 MMHG

## 2023-06-05 DIAGNOSIS — O24.912 UNSPECIFIED DIABETES MELLITUS IN PREGNANCY, SECOND TRIMESTER: ICD-10-CM

## 2023-06-05 DIAGNOSIS — Z34.90 ENCOUNTER FOR SUPERVISION OF NORMAL PREGNANCY, UNSPECIFIED, UNSPECIFIED TRIMESTER: ICD-10-CM

## 2023-06-05 LAB
BP DIAS: 80 MM HG
BP SYS: 130 MM HG
FETAL MOVEMENT: PRESENT
GLUCOSE BLDC GLUCOMTR-MCNC: 90 MG/DL — SIGNIFICANT CHANGE UP (ref 70–99)
OB COMMENTS: NORMAL
SCHEDULED VISIT: YES
URINE ALBUMIN/PROTEIN: NORMAL
URINE GLUCOSE: NORMAL
URINE KETONES: NORMAL
WEEKS GESTATION: 22.6

## 2023-06-05 PROCEDURE — ZZZZZ: CPT

## 2023-06-05 PROCEDURE — 99213 OFFICE O/P EST LOW 20 MIN: CPT | Mod: TH

## 2023-06-05 PROCEDURE — 82962 GLUCOSE BLOOD TEST: CPT

## 2023-06-05 PROCEDURE — 82948 REAGENT STRIP/BLOOD GLUCOSE: CPT

## 2023-06-05 PROCEDURE — 81002 URINALYSIS NONAUTO W/O SCOPE: CPT

## 2023-06-05 PROCEDURE — 99213 OFFICE O/P EST LOW 20 MIN: CPT

## 2023-06-05 RX ORDER — INSULIN HUMAN 100 [IU]/ML
100 INJECTION, SUSPENSION SUBCUTANEOUS
Qty: 1 | Refills: 3 | Status: ACTIVE | COMMUNITY
Start: 2023-06-05 | End: 1900-01-01

## 2023-06-05 NOTE — DISCUSSION/SUMMARY
[FreeTextEntry1] : 23\par MFM Att'g & PGY-3 f/u Consult Note:\par 32y  at 22w6d by LMP 22, EDUARDO 10/3/2023, returns for f/u of DM2. Referred by Dr. Cason & Luisa. Denies any abdominal cramping or vaginal bleeding. Patient endorses fetal movement. \par Had been taking her NPH at 5pm and midnight, as a result she has a few episodes of symptomatic hypoglycemia to the 50s. Now takes NPH at 12 midday. \par She eats breakfast at 9AM, lunch at 5 PM due to her work schedule but has a snack in between., Eats dinner at 9-10pm. \par Patient often will wake up at around 3AM to have a snack.\par \par PMHx: 2020: DM2, Dx'd after her last pregnancy w/GDMA-2. Last Hb A1C on 23 was 8.0%. \par  PP Preecl (severe), mild Chr Htn, not requiring meds at this time. \par  Denies Asthma, other illnesses. \par Surg: denies \par Meds: Metformin 1000 mg BID started 23 d/c'd . \par Janumet  mg daily d/c'd 23 \par Insulin: NPH and Lispro - see dosing below. \par 2019: Labetalol/Nifedipine continued 1-2mo PP. \par Allergies: NKDA \par FHx: \par SocHx: . Lives w/ and 3 y/o child. English proficiency limited. Recent arrival from [place]. Denies substance abuse x3. \par OBhx:  \par 2019: , GDMA-2. 10# female. Rx'd Glyburide. Discharged home PPD#10. \par  Postpartum Preeclampsia w/severe features, received magnesium, Labetalol and Nifedipine which she took for 1-2mo then D/C'd by her PMD. \par  Postpartum hemorrhage x2 requiring 5u pRBC, had a "balloon" placed to stop the bleeding. \par GYN: denies \par Vaccinations: Influenza: declines. Strong recommendation for flu vaccination in pregnancy reviewed. \par  CoVID: Rec'd J&J in [place]. Declined boosters" . \par ROS: As above \par \par Px: Appears very happy today. \par VS: /83, HR: 92bpm , Wt: 180 lbs. O2 sat: 98%, , Office FS: 90 (fasting)\par Udip: neg glucose, neg ketones, neg protein, trace leuks\par \par GA: AOx3, NAD\par Heart: RRR, no M/R/G\par Lungs: CTAB\par Abd: soft, nontender, nondistended\par LE: no erythema, edema or pain \par ---------------------\par \par LAB: Opos/AntibNEG; HbEP Not found. \par 23: 5.8k>12.5/39.3%<410k; mcv 75.3 \par : TSH 3.1; Fe Sat 14% (decr) labs o/w normal. TIBC 14 (decr). Upr/Cr 0.1 \par Quantiferon neg; SMA: Possible silent carrier.; alpha thal: aa/a-: silent carrier. \par \par OBUS: \par 3/7/23: Single IUP at 10w0d by LMP, ant plac. CRL 9w4d, normal growth. \par 3/28/23: Single IUP at 13w0d, CRL 12w4d. NT 0.9mm normal\par  variable, anterior placenta, MVP: 4.62cm, EFW: 124gm, limited views\par \par Impression/Recommendations: \par 32y  at 22w6d, by LMP 22, presents for f/u MFM consult for T2DM. \par \par 1. DM2: \par FS log reviewed since : Fasting FS  but mostly mid 90's (All except 5 values are elevated), 2hr PP well controlled until this week when she had elevations to 130-140 after lunch and dinner.\par Currently taking NPH 18 units at noon and 28 units at midnight before bedtime. Takes 3AM fingersticks occasionally when gets up to eat.\par Current Insulin: NPH 18U AM/ 28U with dinner, Admelog . Advised to increase night NPH to 30 units and increase 5PM admelog to 10 units.\par - Diabetes teaching by RN done; f/u needed. pt cell [phone]. \par - Declined nutritionist consult at this time and will consider it for next visit. \par - will give referral for Cardiologist. Patient counselled on importance of baseline testing with ECHO and EKG\par - Opthalmology and podiatry consults still pending.\par - Refills of glucose test strips, pen needles and alcohol given.\par \par 2. Pregnancy \par - Previously genetic testing for silent alpha thalassemia carrier status discussed. Patient was reassured that her silent carrier status for alpha thalassemia will not entail any clinical significance; partner testing not necessary.\par - AFP and NIPT low risk.\par - Continue with PNV. \par \par 3. Hx severe preeclampsia. Mild CHTN not requiring meds (Was as high as 147/84 in Feb and 150/88 on .\par --> Patient did not bring BP log today but states blood pressures ~ 120-125/80\par --> Baseline PELs WNL, Urprcr 0.1.\par --> No antihypertensives needed now. \par --> Continue ASA 81.\par ->  Delivery at ~ 38 weeks if remains stable.\par \par RTC in 1-2 wk\par \par

## 2023-06-06 DIAGNOSIS — O10.919 UNSPECIFIED PRE-EXISTING HYPERTENSION COMPLICATING PREGNANCY, UNSPECIFIED TRIMESTER: ICD-10-CM

## 2023-06-06 DIAGNOSIS — O24.112 PRE-EXISTING TYPE 2 DIABETES MELLITUS, IN PREGNANCY, SECOND TRIMESTER: ICD-10-CM

## 2023-06-06 LAB
BILIRUB UR QL STRIP: NORMAL
CLARITY UR: CLEAR
COLLECTION METHOD: NORMAL
GLUCOSE UR-MCNC: NORMAL
HCG UR QL: 0.2 EU/DL
HGB UR QL STRIP.AUTO: NORMAL
KETONES UR-MCNC: NORMAL
LEUKOCYTE ESTERASE UR QL STRIP: NORMAL
NITRITE UR QL STRIP: NORMAL
PH UR STRIP: 5
PROT UR STRIP-MCNC: NORMAL
SP GR UR STRIP: 1.01

## 2023-06-07 LAB — GLUCOSE BLDC GLUCOMTR-MCNC: 90

## 2023-06-16 ENCOUNTER — NON-APPOINTMENT (OUTPATIENT)
Age: 33
End: 2023-06-16

## 2023-06-20 ENCOUNTER — APPOINTMENT (OUTPATIENT)
Dept: ANTEPARTUM | Facility: CLINIC | Age: 33
End: 2023-06-20

## 2023-06-30 ENCOUNTER — APPOINTMENT (OUTPATIENT)
Dept: ANTEPARTUM | Facility: CLINIC | Age: 33
End: 2023-06-30
Payer: MEDICAID

## 2023-06-30 ENCOUNTER — RESULT CHARGE (OUTPATIENT)
Age: 33
End: 2023-06-30

## 2023-06-30 ENCOUNTER — ASOB RESULT (OUTPATIENT)
Age: 33
End: 2023-06-30

## 2023-06-30 ENCOUNTER — OUTPATIENT (OUTPATIENT)
Dept: OUTPATIENT SERVICES | Facility: HOSPITAL | Age: 33
LOS: 1 days | End: 2023-06-30
Payer: MEDICAID

## 2023-06-30 VITALS
OXYGEN SATURATION: 99 % | TEMPERATURE: 97.9 F | SYSTOLIC BLOOD PRESSURE: 136 MMHG | BODY MASS INDEX: 28.35 KG/M2 | HEART RATE: 98 BPM | DIASTOLIC BLOOD PRESSURE: 84 MMHG | WEIGHT: 181 LBS

## 2023-06-30 DIAGNOSIS — O09.93 SUPERVISION OF HIGH RISK PREGNANCY, UNSPECIFIED, THIRD TRIMESTER: ICD-10-CM

## 2023-06-30 LAB
AFP MOM: 0.86
AFP VALUE: 18.7 NG/ML
ALPHA FETOPROTEIN SERUM COMMENT: NORMAL
ALPHA FETOPROTEIN SERUM INTERPRETATION: NORMAL
ALPHA FETOPROTEIN SERUM RESULTS: NORMAL
ALPHA FETOPROTEIN SERUM TEST RESULTS: NORMAL
BILIRUB UR QL STRIP: NORMAL
BP DIAS: 63 MM HG
BP DIAS: 83 MM HG
BP DIAS: 84 MM HG
BP SYS: 127 MM HG
BP SYS: 136 MM HG
BP SYS: 138 MM HG
CLARITY UR: CLEAR
COLLECTION METHOD: NORMAL
ESTIMATED AVERAGE GLUCOSE: 111 MG/DL
FETAL HEART RATE (BPM): 150
FETAL MOVEMENT: PRESENT
FETAL MOVEMENT: PRESENT
GESTATIONAL AGE BASED ON: NORMAL
GESTATIONAL AGE ON COLLECTION DATE: 16.3 WEEKS
GLUCOSE BLDC GLUCOMTR-MCNC: 122
GLUCOSE BLDC GLUCOMTR-MCNC: 150
GLUCOSE BLDC GLUCOMTR-MCNC: 93
GLUCOSE UR-MCNC: NORMAL
HBA1C MFR BLD HPLC: 5.5 %
HCG UR QL: 0.2 EU/DL
HGB UR QL STRIP.AUTO: NORMAL
INSULIN DEP DIABETES: YES
KETONES UR-MCNC: NORMAL
LEUKOCYTE ESTERASE UR QL STRIP: NORMAL
MATERNAL AGE AT EDD AFP: 31.3 YR
MULTIPLE GESTATION: NO
NITRITE UR QL STRIP: NORMAL
OB COMMENTS: NORMAL
OSBR RISK 1 IN: 6184
PH UR STRIP: 6.5
PH UR STRIP: 7
PH UR STRIP: 7.5
PROT UR STRIP-MCNC: NORMAL
RACE: NORMAL
SCHEDULED VISIT: YES
SP GR UR STRIP: 1.01
SP GR UR STRIP: 1.01
SP GR UR STRIP: 1.02
URINE ALBUMIN/PROTEIN: NORMAL
URINE GLUCOSE: NORMAL
URINE KETONES: NORMAL
WEEKS GESTATION: 16.2
WEEKS GESTATION: 20
WEEKS GESTATION: 26.3
WEIGHT AFP: 237 LBS

## 2023-06-30 PROCEDURE — ZZZZZ: CPT

## 2023-06-30 PROCEDURE — 81002 URINALYSIS NONAUTO W/O SCOPE: CPT

## 2023-06-30 PROCEDURE — 99214 OFFICE O/P EST MOD 30 MIN: CPT | Mod: TH,25

## 2023-06-30 PROCEDURE — 76816 OB US FOLLOW-UP PER FETUS: CPT

## 2023-06-30 PROCEDURE — 76816 OB US FOLLOW-UP PER FETUS: CPT | Mod: 26

## 2023-06-30 PROCEDURE — 82962 GLUCOSE BLOOD TEST: CPT

## 2023-06-30 PROCEDURE — 82948 REAGENT STRIP/BLOOD GLUCOSE: CPT

## 2023-06-30 RX ORDER — PEN NEEDLE, DIABETIC 33 GX5/32"
33G X 4 MM NEEDLE, DISPOSABLE MISCELLANEOUS
Qty: 120 | Refills: 3 | Status: ACTIVE | COMMUNITY
Start: 2023-02-07 | End: 1900-01-01

## 2023-06-30 RX ORDER — KRILL/OM-3/DHA/EPA/PHOSPHO/AST 1000-230MG
81 CAPSULE ORAL DAILY
Qty: 30 | Refills: 6 | Status: ACTIVE | COMMUNITY
Start: 2023-03-28 | End: 1900-01-01

## 2023-06-30 RX ORDER — INSULIN HUMAN 100 [IU]/ML
100 INJECTION, SUSPENSION SUBCUTANEOUS TWICE DAILY
Qty: 5 | Refills: 5 | Status: ACTIVE | COMMUNITY
Start: 2023-02-14 | End: 1900-01-01

## 2023-07-06 DIAGNOSIS — O10.912 UNSPECIFIED PRE-EXISTING HYPERTENSION COMPLICATING PREGNANCY, SECOND TRIMESTER: ICD-10-CM

## 2023-07-06 DIAGNOSIS — E11.9 TYPE 2 DIABETES MELLITUS WITHOUT COMPLICATIONS: ICD-10-CM

## 2023-07-06 DIAGNOSIS — O35.8XX0 MATERNAL CARE FOR OTHER (SUSPECTED) FETAL ABNORMALITY AND DAMAGE, NOT APPLICABLE OR UNSPECIFIED: ICD-10-CM

## 2023-07-06 DIAGNOSIS — Z3A.26 26 WEEKS GESTATION OF PREGNANCY: ICD-10-CM

## 2023-07-10 ENCOUNTER — NON-APPOINTMENT (OUTPATIENT)
Age: 33
End: 2023-07-10

## 2023-07-10 NOTE — DISCUSSION/SUMMARY
[FreeTextEntry1] : 32y  at 26w3d by LMP 22, EDUARDO 10/3/2023, returns for f/u of DM2. Last seen by Dr. Minaya on . Transferred by Dr. Cason. Denies LOF, VB. or CTX. Endorses fetal movement.\par Patient reports she has been compliant with diabetic diet and with insulin regimen. She is taking NPH 18U AM/ 28U with dinner, Admelog 10/x/10. She states she takes her NPH after breakfast at about 1PM because she felt as though her sugar was dropping when she took it in the morning. Has breakfast at 9-10AM, lunch at 5PM, and dinner at midnight. Patient often wakes up at night for a nighttime snack. \par \par PMHx: 2020: DM2, Dx'd after her last pregnancy w/GDMA-2. Last Hb A1C on 23 was 8.0%. \par PP Preeclampsia (severe), mild chronic hypertension, not requiring medications at this time. \par Denies Asthma, other illnesses. \par Surg: denies \par Meds: Metformin 1000 mg BID started 23 d/c'd . \par Janumet  mg daily d/c'd 23 \par Insulin: NPH and Lispro - see dosing below. \par 2019: Labetalol/Nifedipine continued 1-2mo PP. \par Allergies: NKDA \par FHx: \par SocHx: . Lives w/ and 3 y/o child. English proficiency limited. Recent arrival from [place]. Denies substance abuse x3. \par OBhx:  \par 2019: , GDMA-2. 10# female. Rx'd Glyburide. Discharged home PPD#10. \par  Postpartum Preeclampsia w/severe features, received magnesium, Labetalol and Nifedipine which she took for 1-2mo then D/C'd by her PMD. \par Postpartum hemorrhage x2 requiring 5u pRBC, had a "balloon" placed to stop the bleeding. \par GYN: denies \par Vaccinations: Influenza: declines. Strong recommendation for flu vaccination in pregnancy reviewed. \par CoVID: Rec'd J&J in [place]. Declined boosters. \par \par \par Px: Appears very happy today. \par VS: /83, HR: 98 bpm , Wt: 181 lbs. O2 sat: 99 %, FHR f/u sonogram report, Office FS: 122 (2hrs pp)\par Udip: small leuks\par GA: AOx3, NAD\par Heart: RRR, no M/R/G\par Lungs: CTAB\par Abd: soft, nontender, nondistended\par LE: no erythema, edema or pain \par ---------------------\par \par LAB: Opos/AntibNEG; HbEP Not found. \par 23: 5.8k>12.5/39.3%<410k; mcv 75.3 \par : TSH 3.1; Fe Sat 14% (decr) labs o/w normal. TIBC 14 (decr). Upr/Cr 0.1 \par Quantiferon neg; SMA: Possible silent carrier.; alpha thal: aa/a-: silent carrier. \par \par OBUS: \par 3/7/23: Single IUP at 10w0d by LMP, ant plac. CRL 9w4d, normal growth. \par 3/28/23: Single IUP at 13w0d, CRL 12w4d. NT 0.9mm normal\par  variable, anterior placenta, MVP: 4.62cm, EFW: 124gm, limited views\par  breech, anterior placenta no previa, central cord insertion, MVP 4.62cm, 319gm, normal anatomy, limited views\par  normal f/u anatomy, still some limited cardiac views\par \par Impression/Recommendations: \par 32y  at 26w3d, by LMP 22, presents for f/u MFM consult for T2DM. \par \par 1. DM2: \par FS log reviewed since last visit, FFS ~80-110s, PPFS:  (mostly within range except some after dinner values).\par CURRENT Insulin Regimen: NPH 18U AM/ 28U with dinner, Admelog 10/x/10\par NEW Insulin Regimen: NPH 18U AM/ 30U with dinner, Admelog 10/x/11\par - will give referral for Cardiologist. Patient counselled on importance of baseline testing with ECHO and EKG - referrals re-printed\par - Opthalmology and podiatry consults still pending.\par - Patient advised to obtain fetal echo, given referral and instructions.\par - Refills of glucose test strips, pen needles and alcohol given.\par \par 2. Pregnancy \par - Previously genetic testing for silent alpha thalassemia carrier status discussed. Patient was reassured that her silent carrier status for alpha thalassemia will not entail any clinical significance; partner testing not necessary.\par - AFP and NIPT low risk.\par - Continue with PNV. \par \par 3. Hx severe preeclampsia. Mild CHTN not requiring medication (Was as high as 147/84 in Feb and 150/88 on )\par - Patient did not bring BP logs again today but reports BPs at home <140/90\par - Baseline PELs WNL, Urprcr 0.1.\par - Sent for 24hr UTP collection\par - No antihypertensives needed now. \par - Continue ASA 81.\par - Delivery at ~ 38 weeks if remains stable and unless otherwise indicated. .\par \par RTC 2 weeks for visit, 4 weeks for sonogram.

## 2023-07-14 ENCOUNTER — OUTPATIENT (OUTPATIENT)
Dept: OUTPATIENT SERVICES | Facility: HOSPITAL | Age: 33
LOS: 1 days | End: 2023-07-14
Payer: MEDICAID

## 2023-07-14 ENCOUNTER — RESULT CHARGE (OUTPATIENT)
Age: 33
End: 2023-07-14

## 2023-07-14 ENCOUNTER — APPOINTMENT (OUTPATIENT)
Dept: ANTEPARTUM | Facility: CLINIC | Age: 33
End: 2023-07-14
Payer: MEDICAID

## 2023-07-14 VITALS
DIASTOLIC BLOOD PRESSURE: 76 MMHG | WEIGHT: 181 LBS | BODY MASS INDEX: 28.35 KG/M2 | SYSTOLIC BLOOD PRESSURE: 120 MMHG | HEART RATE: 87 BPM | OXYGEN SATURATION: 100 %

## 2023-07-14 DIAGNOSIS — O09.90 SUPERVISION OF HIGH RISK PREGNANCY, UNSPECIFIED, UNSPECIFIED TRIMESTER: ICD-10-CM

## 2023-07-14 DIAGNOSIS — Z34.90 ENCOUNTER FOR SUPERVISION OF NORMAL PREGNANCY, UNSPECIFIED, UNSPECIFIED TRIMESTER: ICD-10-CM

## 2023-07-14 LAB
BILIRUB UR QL STRIP: NORMAL
BP DIAS: 76 MM HG
BP SYS: 120 MM HG
CLARITY UR: CLEAR
COLLECTION METHOD: NORMAL
FETAL HEART RATE (BPM): 144
FETAL MOVEMENT: PRESENT
GLUCOSE BLDC GLUCOMTR-MCNC: 95
GLUCOSE UR-MCNC: NORMAL
HCG UR QL: 0.2 EU/DL
HGB UR QL STRIP.AUTO: NORMAL
KETONES UR-MCNC: NORMAL
LEUKOCYTE ESTERASE UR QL STRIP: NORMAL
NITRITE UR QL STRIP: NORMAL
OB COMMENTS: NORMAL
PH UR STRIP: 6.5
PROT UR STRIP-MCNC: NORMAL
SCHEDULED VISIT: YES
SP GR UR STRIP: 1.01
URINE ALBUMIN/PROTEIN: NORMAL
URINE GLUCOSE: NORMAL
URINE KETONES: NORMAL
WEEKS GESTATION: 28.3

## 2023-07-14 PROCEDURE — 81002 URINALYSIS NONAUTO W/O SCOPE: CPT

## 2023-07-14 PROCEDURE — 82948 REAGENT STRIP/BLOOD GLUCOSE: CPT

## 2023-07-14 PROCEDURE — 99213 OFFICE O/P EST LOW 20 MIN: CPT | Mod: TH

## 2023-07-14 PROCEDURE — 82962 GLUCOSE BLOOD TEST: CPT

## 2023-07-14 PROCEDURE — 99213 OFFICE O/P EST LOW 20 MIN: CPT

## 2023-07-14 PROCEDURE — 84156 ASSAY OF PROTEIN URINE: CPT

## 2023-07-14 RX ORDER — METFORMIN HYDROCHLORIDE 500 MG/1
500 TABLET, COATED ORAL
Qty: 60 | Refills: 1 | Status: ACTIVE | COMMUNITY
Start: 2023-02-06 | End: 1900-01-01

## 2023-07-14 RX ORDER — BLOOD SUGAR DIAGNOSTIC
STRIP MISCELLANEOUS 4 TIMES DAILY
Qty: 120 | Refills: 5 | Status: ACTIVE | COMMUNITY
Start: 2023-06-05 | End: 1900-01-01

## 2023-07-14 RX ORDER — INSULIN LISPRO 100 U/ML
100 INJECTION, SOLUTION SUBCUTANEOUS
Qty: 3 | Refills: 0 | Status: ACTIVE | COMMUNITY
Start: 2023-02-14 | End: 1900-01-01

## 2023-07-14 RX ORDER — PEN NEEDLE, DIABETIC 32GX 5/32"
32G X 4 MM NEEDLE, DISPOSABLE MISCELLANEOUS
Qty: 120 | Refills: 3 | Status: ACTIVE | COMMUNITY
Start: 2023-06-05 | End: 1900-01-01

## 2023-07-14 RX ORDER — ISOPROPYL ALCOHOL 0.7 ML/ML
SWAB TOPICAL
Qty: 120 | Refills: 5 | Status: ACTIVE | COMMUNITY
Start: 2023-06-05 | End: 1900-01-01

## 2023-07-14 NOTE — DISCUSSION/SUMMARY
[FreeTextEntry1] : 32y  at 28w3d by LMP 22, EDUARDO 10/3/2023, returns for f/u of DM2. Recieves care from Dr. Minaya. Denies LOF, VB. or CTX. Endorses fetal movement.\par Patient reports she has been compliant with diabetic diet and with insulin regimen. She is taking NPH 18U AM/ 30U with dinner, Admelog 10/x/11. Patient often eats a snack overnight and eats dinner at 8 PM. Throughout the day she is snacking and she eats a snack instead of dinner in the evening at about midnight.  \par \par PMHx: 2020: DM2, Dx'd after her last pregnancy w/GDMA-2. Last Hb A1C on 23 was 8.0%. Past treated with Janumet (Januvia/metformin).\par PP Preeclampsia (severe), mild chronic hypertension, not requiring medications at this time. \par Denies Asthma, other illnesses. \par Surg: denies \par Meds: Metformin 1000 mg BID started 23 d/c'd . \par Janumet  mg daily d/c'd 23 \par Insulin: NPH and Lispro - see dosing below. \par 2019: Labetalol/Nifedipine continued 1-2mo PP. \par Allergies: NKDA \par FHx: \par SocHx: . Lives w/ and 3 y/o child. English proficiency limited. Recent arrival from [place]. Denies substance abuse x3. \par OBhx:  \par 2019: , GDMA-2. 10# female. Rx'd Glyburide. Discharged home PPD#10. \par  Postpartum Preeclampsia w/severe features, received magnesium, Labetalol and Nifedipine which she took for 1-2mo then D/C'd by her PMD. \par Postpartum hemorrhage x2 requiring 5u pRBC, had a "balloon" placed to stop the bleeding. \par GYN: denies \par Vaccinations: Influenza: declines. Strong recommendation for flu vaccination in pregnancy reviewed. \par CoVID: Rec'd J&J in [place]. Declined boosters. \par \par VS: /84->120/72 , HR: 87 bpm , Wt: 181 lbs. O2 sat: 100 %, FHR: , Office FS: 95 (fasting)\par Udip: trace protein\par GA: AOx3, NAD\par Heart: RRR, no M/R/G\par Lungs: CTAB\par Abd: soft, nontender, nondistended\par LE: no erythema, edema or pain \par ---------------------\par \par LAB: Opos/AntibNEG; HbEP Not found. \par 23: 5.8k>12.5/39.3%<410k; mcv 75.3 \par : TSH 3.1; Fe Sat 14% (decr) labs o/w normal. TIBC 14 (decr). Upr/Cr 0.1 \par Quantiferon neg; SMA: Possible silent carrier.; alpha thal: aa/a-: silent carrier. \par \par OBUS: \par 3/7/23: Single IUP at 10w0d by LMP, ant plac. CRL 9w4d, normal growth. \par 3/28/23: Single IUP at 13w0d, CRL 12w4d. NT 0.9mm normal\par  variable, anterior placenta, MVP: 4.62cm, EFW: 124gm, limited views\par  breech, anterior placenta no previa, central cord insertion, MVP 4.62cm, 319gm, normal anatomy, limited views\par  normal f/u anatomy, still some limited cardiac views, MVP 4.97cm, EFW 871gm 21%ile\par \par Impression/Recommendations: \par 32y  at 28w3d, by LMP 22, presents for f/u for T2DM. \par \par 1. DM2: \par FS log reviewed since last visit, Occasionally checks after 1 hour,\par FFS  ( elevated), PPFS  ( elevated - not counting 1H pp). High excursions in BG and erratic eating pattern render it difficult to raise the daytime insulin. Will only raise the PM NPH and add metformin.\par CURRENT Insulin Regimen: NPH 18U AM/ 30U with dinner, Admelog 10/x/11\par NEW Insulin Regimen: NPH 18U AM/ 32U with dinner, Admelog 10/x/11 AND metformin 500 mg BID\par - Personally scheduled appointment for cardiologist for patient, Patient counselled on importance of baseline testing with ECHO and EKG - referrals re-printed\par - Opthalmology and podiatry consults still pending.\par - Patient advised to obtain fetal echo, has appointment scheduled with Dr. Maki\par - Refills of glucose test strips, pen needles and alcohol given.\par \par 2. Pregnancy \par - Previously genetic testing for silent alpha thalassemia carrier status discussed. Patient was reassured that her silent carrier status for alpha thalassemia will not entail any clinical significance; partner testing not necessary.\par - AFP and NIPT low risk.\par - Continue with PNV. \par \par 3. Hx severe preeclampsia. Mild CHTN not requiring medication (Was as high as 147/84 in Feb and 150/88 on )\par - BP log - 110-130/74-84\par - Baseline PELs WNL, Urprcr 0.1.\par - UTP collected today\par - No antihypertensives needed now. \par - Continue ASA 81.\par - Delivery at ~ 38 weeks if remains stable and unless otherwise indicated. \par \par RTC 2 weeks

## 2023-07-15 ENCOUNTER — NON-APPOINTMENT (OUTPATIENT)
Age: 33
End: 2023-07-15

## 2023-07-15 LAB
BILIRUB UR QL STRIP: NORMAL
BP DIAS: 59 MM HG
BP SYS: 119 MM HG
CHROMOSOME13 INTERPRETATION: NORMAL
CHROMOSOME13 TEST RESULT: NORMAL
CHROMOSOME18 INTERPRETATION: NORMAL
CHROMOSOME18 TEST RESULT: NORMAL
CHROMOSOME21 INTERPRETATION: NORMAL
CHROMOSOME21 TEST RESULT: NORMAL
CLARITY UR: CLEAR
COLLECTION METHOD: NORMAL
FETAL FRACTION: NORMAL
GLUCOSE BLDC GLUCOMTR-MCNC: 75
GLUCOSE UR-MCNC: NORMAL
HCG UR QL: 0.2 EU/DL
HGB UR QL STRIP.AUTO: NORMAL
KETONES UR-MCNC: NORMAL
LEUKOCYTE ESTERASE UR QL STRIP: NORMAL
NITRITE UR QL STRIP: NORMAL
OB COMMENTS: NORMAL
PERFORMANCE AND LIMITATIONS: NORMAL
PH UR STRIP: 7.5
PROT UR STRIP-MCNC: NORMAL
SCHEDULED VISIT: YES
SEX CHROMOSOME INTERPRETATION: NORMAL
SEX CHROMOSOME TEST RESULT: NORMAL
SP GR UR STRIP: 1.01
URINE ALBUMIN/PROTEIN: NORMAL
URINE GLUCOSE: NORMAL
URINE KETONES: NORMAL
VERIFI PRENATAL TEST: NOT DETECTED
WEEKS GESTATION: 13

## 2023-07-16 ENCOUNTER — NON-APPOINTMENT (OUTPATIENT)
Age: 33
End: 2023-07-16

## 2023-07-17 ENCOUNTER — NON-APPOINTMENT (OUTPATIENT)
Age: 33
End: 2023-07-17

## 2023-07-17 DIAGNOSIS — O10.919 UNSPECIFIED PRE-EXISTING HYPERTENSION COMPLICATING PREGNANCY, UNSPECIFIED TRIMESTER: ICD-10-CM

## 2023-07-17 DIAGNOSIS — Z3A.38 38 WEEKS GESTATION OF PREGNANCY: ICD-10-CM

## 2023-07-17 DIAGNOSIS — O24.912 UNSPECIFIED DIABETES MELLITUS IN PREGNANCY, SECOND TRIMESTER: ICD-10-CM

## 2023-07-17 LAB
CREAT 24H UR-MCNC: 1.1 G/24 HR
CREAT ?TM UR-MCNC: 43 MG/DL
PROT 24H UR-MRATE: 10 MG/DL
PROT ?TM UR-MCNC: 24 HR
SPECIMEN VOL 24H UR: 2625 ML

## 2023-07-18 ENCOUNTER — APPOINTMENT (OUTPATIENT)
Dept: ANTEPARTUM | Facility: CLINIC | Age: 33
End: 2023-07-18

## 2023-07-21 ENCOUNTER — APPOINTMENT (OUTPATIENT)
Dept: CARDIOLOGY | Facility: CLINIC | Age: 33
End: 2023-07-21

## 2023-07-24 ENCOUNTER — NON-APPOINTMENT (OUTPATIENT)
Age: 33
End: 2023-07-24

## 2023-07-24 ENCOUNTER — APPOINTMENT (OUTPATIENT)
Dept: OBGYN | Facility: CLINIC | Age: 33
End: 2023-07-24
Payer: MEDICAID

## 2023-07-24 ENCOUNTER — OUTPATIENT (OUTPATIENT)
Dept: OUTPATIENT SERVICES | Facility: HOSPITAL | Age: 33
LOS: 1 days | End: 2023-07-24
Payer: MEDICAID

## 2023-07-24 VITALS — BODY MASS INDEX: 28.66 KG/M2 | SYSTOLIC BLOOD PRESSURE: 146 MMHG | WEIGHT: 183 LBS | DIASTOLIC BLOOD PRESSURE: 90 MMHG

## 2023-07-24 VITALS — SYSTOLIC BLOOD PRESSURE: 120 MMHG | DIASTOLIC BLOOD PRESSURE: 80 MMHG

## 2023-07-24 DIAGNOSIS — Z34.90 ENCOUNTER FOR SUPERVISION OF NORMAL PREGNANCY, UNSPECIFIED, UNSPECIFIED TRIMESTER: ICD-10-CM

## 2023-07-24 PROCEDURE — 99213 OFFICE O/P EST LOW 20 MIN: CPT

## 2023-07-24 PROCEDURE — 99213 OFFICE O/P EST LOW 20 MIN: CPT | Mod: TH

## 2023-07-24 PROCEDURE — 81002 URINALYSIS NONAUTO W/O SCOPE: CPT

## 2023-07-25 ENCOUNTER — APPOINTMENT (OUTPATIENT)
Dept: PEDIATRIC CARDIOLOGY | Facility: CLINIC | Age: 33
End: 2023-07-25

## 2023-07-27 ENCOUNTER — APPOINTMENT (OUTPATIENT)
Dept: ANTEPARTUM | Facility: CLINIC | Age: 33
End: 2023-07-27
Payer: MEDICAID

## 2023-07-27 ENCOUNTER — OUTPATIENT (OUTPATIENT)
Dept: OUTPATIENT SERVICES | Facility: HOSPITAL | Age: 33
LOS: 1 days | End: 2023-07-27
Payer: MEDICAID

## 2023-07-27 ENCOUNTER — ASOB RESULT (OUTPATIENT)
Age: 33
End: 2023-07-27

## 2023-07-27 VITALS
DIASTOLIC BLOOD PRESSURE: 67 MMHG | SYSTOLIC BLOOD PRESSURE: 123 MMHG | OXYGEN SATURATION: 100 % | HEART RATE: 66 BPM | BODY MASS INDEX: 28.82 KG/M2 | TEMPERATURE: 98.4 F | WEIGHT: 184 LBS

## 2023-07-27 DIAGNOSIS — O09.90 SUPERVISION OF HIGH RISK PREGNANCY, UNSPECIFIED, UNSPECIFIED TRIMESTER: ICD-10-CM

## 2023-07-27 DIAGNOSIS — E11.9 TYPE 2 DIABETES MELLITUS WITHOUT COMPLICATIONS: ICD-10-CM

## 2023-07-27 DIAGNOSIS — Z34.90 ENCOUNTER FOR SUPERVISION OF NORMAL PREGNANCY, UNSPECIFIED, UNSPECIFIED TRIMESTER: ICD-10-CM

## 2023-07-27 LAB
BILIRUB UR QL STRIP: NORMAL
BP DIAS: 67 MM HG
BP SYS: 123 MM HG
CLARITY UR: CLEAR
COLLECTION METHOD: NORMAL
FETAL MOVEMENT: PRESENT
GLUCOSE BLDC GLUCOMTR-MCNC: 71
GLUCOSE UR-MCNC: NORMAL
HCG UR QL: 0.2 EU/DL
HGB UR QL STRIP.AUTO: NORMAL
KETONES UR-MCNC: NORMAL
LEUKOCYTE ESTERASE UR QL STRIP: NORMAL
NITRITE UR QL STRIP: NORMAL
OB COMMENTS: NORMAL
PH UR STRIP: 7
PROT UR STRIP-MCNC: NORMAL
SCHEDULED VISIT: YES
SP GR UR STRIP: 1.01
URINE ALBUMIN/PROTEIN: NORMAL
URINE GLUCOSE: NORMAL
URINE KETONES: NORMAL
WEEKS GESTATION: 30.2

## 2023-07-27 PROCEDURE — 81002 URINALYSIS NONAUTO W/O SCOPE: CPT

## 2023-07-27 PROCEDURE — 99214 OFFICE O/P EST MOD 30 MIN: CPT

## 2023-07-27 PROCEDURE — 99214 OFFICE O/P EST MOD 30 MIN: CPT | Mod: TH,25

## 2023-07-27 PROCEDURE — 76816 OB US FOLLOW-UP PER FETUS: CPT

## 2023-07-27 PROCEDURE — 82962 GLUCOSE BLOOD TEST: CPT

## 2023-07-27 PROCEDURE — 76816 OB US FOLLOW-UP PER FETUS: CPT | Mod: 26

## 2023-07-27 PROCEDURE — 82948 REAGENT STRIP/BLOOD GLUCOSE: CPT

## 2023-07-27 NOTE — DISCUSSION/SUMMARY
[FreeTextEntry1] : 32y  at 30w2d by LMP 22, EDUARDO 10/3/2023, returns for f/u of DM2. Receives care with Dr. Minaya. Denies LOF, VB. or CTX. Endorses fetal movement.\par Reports she is compliant with diabetic diet and insulin regimen. She denies HA, dizziness, scotomata, SOB, chest pain, RUQ/epigastric pain, or LE swelling.\par \par Her history:\par PMHx: 2020: DM2, Dx'd after her last pregnancy w/GDMA-2. Last Hb A1C on 23 was 8.0%. Past treated with Janumet (Januvia/metformin).\par PP Preeclampsia (severe), mild chronic hypertension, not requiring medications at this time. \par Denies Asthma, other illnesses. \par Surg: denies \par Meds: Metformin 1000 mg BID started 23 d/c'd . \par Janumet  mg daily d/c'd 23 \par Insulin: NPH and Lispro - see dosing below. \par 2019: Labetalol/Nifedipine continued 1-2mo PP. \par Allergies: NKDA \par FHx: \par SocHx: . Lives w/ and 3 y/o child. English proficiency limited. Recent arrival from [place]. Denies substance abuse x3. \par OBhx:  \par 2019: , GDMA-2. 10# female. Rx'd Glyburide. Discharged home PPD#10. \par  Postpartum Preeclampsia w/severe features, received magnesium, Labetalol and Nifedipine which she took for 1-2mo then D/C'd by her PMD. \par Postpartum hemorrhage x2 requiring 5u pRBC, had a "balloon" placed to stop the bleeding. \par GYN: denies \par Vaccinations: Influenza: declines. Strong recommendation for flu vaccination in pregnancy reviewed. \par CoVID: Rec'd J&J in [place]. Declined boosters. \par \par Today:\par VS: /67 , HR: 66 bpm , Wt: 184 lbs. O2 sat: 100 %, FHR: f/u sonogram, Office FS: 71 (3hrs pp)\par Udip: neg\par GA: AOx3, NAD\par Heart: RRR, no M/R/G\par Lungs: CTAB\par Abd: soft, nontender, nondistended\par LE: no erythema, edema or pain \par ---------------------\par \par LAB: Opos/AntibNEG; HbEP Not found. \par 23: 5.8k>12.5/39.3%<410k; mcv 75.3 \par : TSH 3.1; Fe Sat 14% (decr) labs o/w normal. TIBC 14 (decr). Upr/Cr 0.1 \par  UTP 42 (adequate)\par Quantiferon neg; SMA: Possible silent carrier.; alpha thal: aa/a-: silent carrier. \par \par OBUS: \par 3/7/23: Single IUP at 10w0d by LMP, ant plac. CRL 9w4d, normal growth. \par 3/28/23: Single IUP at 13w0d, CRL 12w4d. NT 0.9mm normal\par  variable, anterior placenta, MVP: 4.62cm, EFW: 124gm, limited views\par  breech, anterior placenta no previa, central cord insertion, MVP 4.62cm, 319gm, normal anatomy, limited views\par  normal f/u anatomy, still some limited cardiac views, MVP 4.97cm, EFW 871gm 21%ile\par : today growth is WNL. \par \par Impression/Recommendations: \par 32y  at 30w2d, by LMP 22, presents for f/u for T2DM. \par \par 1. DM2: \par FS log reviewed from - FFS 76-94 (0/9 elevated), PPFS  (0/24 elevated)\par CURRENT Insulin Regimen: NPH 20U AM/ 35U with dinner, Admelog 10/x/12\par - Satisfactory control of BG with current regimen, will keep the same. \par - Previously we scheduled appointment for cardiologist for patient, Patient counselled on importance of baseline testing with ECHO and EKG, she did not attend due to work conflicts, Requisition reprinted today.\par - Opthalmology and podiatry consults still pending.\par - Normal fetal echo.\par - NST/BPP twice weekly starting at 32 weeks. \par \par 2. Hx severe preeclampsia. Mild CHTN not requiring medication (Was as high as 147/84 in Feb and 150/88 on )\par - BP log - 111-130/71-85\par - Baseline PELs WNL, Urprcr 0.1, UTP wnl\par - No antihypertensives needed now. \par - Continue ASA 81, advised d/c @~36wks.\par - Delivery at ~ 38 weeks if remains stable and unless otherwise indicated.\par \par 3. Pregnancy \par - Previously genetic testing for silent alpha thalassemia carrier status discussed. Patient was reassured that her silent carrier status for alpha thalassemia will not entail any clinical significance; partner testing not necessary.\par - AFP and NIPT low risk.\par - Continue with PNV. \par \par Follow-up in 2 weeks for visit and to start fetal surveillance.

## 2023-07-28 DIAGNOSIS — O24.113 PRE-EXISTING TYPE 2 DIABETES MELLITUS, IN PREGNANCY, THIRD TRIMESTER: ICD-10-CM

## 2023-07-28 DIAGNOSIS — O10.919 UNSPECIFIED PRE-EXISTING HYPERTENSION COMPLICATING PREGNANCY, UNSPECIFIED TRIMESTER: ICD-10-CM

## 2023-07-28 DIAGNOSIS — O35.8XX0 MATERNAL CARE FOR OTHER (SUSPECTED) FETAL ABNORMALITY AND DAMAGE, NOT APPLICABLE OR UNSPECIFIED: ICD-10-CM

## 2023-07-28 DIAGNOSIS — Z3A.30 30 WEEKS GESTATION OF PREGNANCY: ICD-10-CM

## 2023-07-31 ENCOUNTER — NON-APPOINTMENT (OUTPATIENT)
Age: 33
End: 2023-07-31

## 2023-08-07 ENCOUNTER — OUTPATIENT (OUTPATIENT)
Dept: OUTPATIENT SERVICES | Facility: HOSPITAL | Age: 33
LOS: 1 days | End: 2023-08-07
Payer: MEDICAID

## 2023-08-07 ENCOUNTER — APPOINTMENT (OUTPATIENT)
Dept: OBGYN | Facility: CLINIC | Age: 33
End: 2023-08-07
Payer: MEDICAID

## 2023-08-07 VITALS — TEMPERATURE: 97.7 F | WEIGHT: 186 LBS | BODY MASS INDEX: 29.19 KG/M2 | HEIGHT: 67 IN

## 2023-08-07 VITALS — DIASTOLIC BLOOD PRESSURE: 80 MMHG | SYSTOLIC BLOOD PRESSURE: 120 MMHG

## 2023-08-07 DIAGNOSIS — Z34.90 ENCOUNTER FOR SUPERVISION OF NORMAL PREGNANCY, UNSPECIFIED, UNSPECIFIED TRIMESTER: ICD-10-CM

## 2023-08-07 LAB
BILIRUB UR QL STRIP: NORMAL
CLARITY UR: CLEAR
COLLECTION METHOD: NORMAL
GLUCOSE UR-MCNC: NORMAL
HCG UR QL: NORMAL EU/DL
HGB UR QL STRIP.AUTO: NORMAL
KETONES UR-MCNC: NORMAL
LEUKOCYTE ESTERASE UR QL STRIP: NORMAL
NITRITE UR QL STRIP: NORMAL
PH UR STRIP: NORMAL
PROT UR STRIP-MCNC: NORMAL
SP GR UR STRIP: NORMAL

## 2023-08-07 PROCEDURE — 99213 OFFICE O/P EST LOW 20 MIN: CPT | Mod: TH

## 2023-08-07 PROCEDURE — 81002 URINALYSIS NONAUTO W/O SCOPE: CPT

## 2023-08-07 PROCEDURE — 99213 OFFICE O/P EST LOW 20 MIN: CPT

## 2023-08-08 DIAGNOSIS — Z34.90 ENCOUNTER FOR SUPERVISION OF NORMAL PREGNANCY, UNSPECIFIED, UNSPECIFIED TRIMESTER: ICD-10-CM

## 2023-08-09 ENCOUNTER — NON-APPOINTMENT (OUTPATIENT)
Age: 33
End: 2023-08-09

## 2023-08-10 ENCOUNTER — OUTPATIENT (OUTPATIENT)
Dept: OUTPATIENT SERVICES | Facility: HOSPITAL | Age: 33
LOS: 1 days | End: 2023-08-10
Payer: MEDICAID

## 2023-08-10 ENCOUNTER — APPOINTMENT (OUTPATIENT)
Dept: ANTEPARTUM | Facility: CLINIC | Age: 33
End: 2023-08-10
Payer: MEDICAID

## 2023-08-10 ENCOUNTER — ASOB RESULT (OUTPATIENT)
Age: 33
End: 2023-08-10

## 2023-08-10 VITALS
WEIGHT: 187.5 LBS | HEART RATE: 78 BPM | TEMPERATURE: 98 F | DIASTOLIC BLOOD PRESSURE: 79 MMHG | HEIGHT: 67 IN | SYSTOLIC BLOOD PRESSURE: 127 MMHG | OXYGEN SATURATION: 98 % | BODY MASS INDEX: 29.43 KG/M2

## 2023-08-10 DIAGNOSIS — O24.912 UNSPECIFIED DIABETES MELLITUS IN PREGNANCY, SECOND TRIMESTER: ICD-10-CM

## 2023-08-10 DIAGNOSIS — O09.90 SUPERVISION OF HIGH RISK PREGNANCY, UNSPECIFIED, UNSPECIFIED TRIMESTER: ICD-10-CM

## 2023-08-10 DIAGNOSIS — Z34.90 ENCOUNTER FOR SUPERVISION OF NORMAL PREGNANCY, UNSPECIFIED, UNSPECIFIED TRIMESTER: ICD-10-CM

## 2023-08-10 LAB
BILIRUB UR QL STRIP: NORMAL
BP DIAS: 79 MM HG
BP SYS: 127 MM HG
CLARITY UR: CLEAR
COLLECTION METHOD: NORMAL
FETAL HEART RATE (BPM): 138
FETAL MOVEMENT: PRESENT
GLUCOSE UR-MCNC: NORMAL
HCG UR QL: 0.2 EU/DL
HGB UR QL STRIP.AUTO: NORMAL
KETONES UR-MCNC: NORMAL
LEUKOCYTE ESTERASE UR QL STRIP: NORMAL
NITRITE UR QL STRIP: NORMAL
OB COMMENTS: NORMAL
PH UR STRIP: 7.5
PROT UR STRIP-MCNC: NORMAL
SCHEDULED VISIT: YES
SP GR UR STRIP: 1
URINE ALBUMIN/PROTEIN: NORMAL
URINE GLUCOSE: NORMAL
URINE KETONES: NORMAL
WEEKS GESTATION: 32.2

## 2023-08-10 PROCEDURE — 99213 OFFICE O/P EST LOW 20 MIN: CPT | Mod: TH,25

## 2023-08-10 PROCEDURE — 99213 OFFICE O/P EST LOW 20 MIN: CPT | Mod: 25

## 2023-08-10 PROCEDURE — 76818 FETAL BIOPHYS PROFILE W/NST: CPT | Mod: 26

## 2023-08-10 PROCEDURE — 82948 REAGENT STRIP/BLOOD GLUCOSE: CPT

## 2023-08-10 PROCEDURE — 82962 GLUCOSE BLOOD TEST: CPT

## 2023-08-10 PROCEDURE — 81002 URINALYSIS NONAUTO W/O SCOPE: CPT

## 2023-08-10 PROCEDURE — 76818 FETAL BIOPHYS PROFILE W/NST: CPT

## 2023-08-10 RX ORDER — BLOOD SUGAR DIAGNOSTIC
STRIP MISCELLANEOUS 4 TIMES DAILY
Qty: 120 | Refills: 3 | Status: ACTIVE | COMMUNITY
Start: 2023-08-10 | End: 1900-01-01

## 2023-08-10 RX ORDER — PNV NO.95/FERROUS FUM/FOLIC AC 28MG-0.8MG
TABLET ORAL DAILY
Qty: 60 | Refills: 0 | Status: ACTIVE | COMMUNITY
Start: 2023-08-10 | End: 1900-01-01

## 2023-08-10 NOTE — DISCUSSION/SUMMARY
[FreeTextEntry1] : 32y  at 32w2d by LMP 22, EDUARDO 10/3/2023, returns for f/u of DM2. Receives care with Dr. Minaya. Denies LOF, VB. or CTX. Endorses fetal movement.  Reports she is compliant with diabetic diet and insulin regimen. She denies HA, dizziness, scotomata, SOB, chest pain, RUQ/epigastric pain, or LE swelling.  2 values of 75 noted on log, patient denies sxs of hypoglycemia.   Her history: PMHx: 2020: DM2, Dx'd after her last pregnancy w/GDMA-2. Last Hb A1C on 23 was 8.0%. Past treated with Janumet (Januvia/metformin). PP Preeclampsia (severe), mild chronic hypertension, not requiring medications at this time. Denies Asthma, other illnesses. Surg: denies Meds: Metformin 1000 mg BID started 23 d/c'd . Janumet  mg daily d/c'd 23 Insulin: NPH and Lispro - see dosing below. 2019: Labetalol/Nifedipine continued 1-2mo PP. Allergies: NKDA FHx: SocHx: . Lives w/ and 3 y/o child. English proficiency limited. Recent arrival from [place]. Denies substance abuse x3. OBhx:  2019: , GDMA-2. 10# female. Rx'd Glyburide. Discharged home PPD#10. Postpartum Preeclampsia w/severe features, received magnesium, Labetalol and Nifedipine which she took for 1-2mo then D/C'd by her PMD. Postpartum hemorrhage x2 requiring 5u pRBC, had a "balloon" placed to stop the bleeding. GYN: denies Vaccinations: Influenza: declines. Strong recommendation for flu vaccination in pregnancy reviewed. CoVID: Rec'd J&J in [place]. Declined boosters.  Today: VS: /79 , HR: 78 bpm , Wt: 187.7 lbs. O2 sat: 98 %, FHR: f/u sonogram, Office FS: 121 (right after granola bar) Udip: neg GA: AOx3, NAD Heart: RRR, no M/R/G Lungs: CTAB Abd: soft, nontender, nondistended LE: no erythema, edema or pain ---------------------  LAB: Opos/AntibNEG; HbEP Not found. 23: 5.8k>12.5/39.3%<410k; mcv 75.3 : TSH 3.1; Fe Sat 14% (decr) labs o/w normal. TIBC 14 (decr). Upr/Cr 0.1  UTP 43 (adequate) Quantiferon neg; SMA: Possible silent carrier.; alpha thal: aa/a-: silent carrier.  OBUS: 3/7/23: Single IUP at 10w0d by LMP, ant plac. CRL 9w4d, normal growth. 3/28/23: Single IUP at 13w0d, CRL 12w4d. NT 0.9mm normal  variable, anterior placenta, MVP: 4.62cm, EFW: 124gm, limited views  breech, anterior placenta no previa, central cord insertion, MVP 4.62cm, 319gm, normal anatomy, limited views  normal f/u anatomy, still some limited cardiac views, MVP 4.97cm, EFW 871gm 21%ile  vtx, EFW 1481 26%ile, MVP 6.32cm 8/10 vtx, BPP 10/10, MVP 7cm  Impression/Recommendations: 32y  at 32w2d, by LMP 22, presents for f/u for T2DM.  1. DM2: FS log reviewed from -8/10 FFS 76-94 (0/ elevated), PPFS  (0-54 elevated) CURRENT Insulin Regimen: NPH 20U AM/ 35U with dinner, Admelog 10/x/12 NEW Insulin Regimen: NPH 20U AM/ 35U with dinner, Admelog  - Reduced breakfast short acting insulin due to borderline values (ALL VALUES < 100) to avoid hypoglycemia - Previously we scheduled appointment for cardiologist for patient, Patient counselled on importance of baseline testing with ECHO and EKG, she did not attend due to work conflicts, given requisition again today - Ophthalmology and podiatry consults still pending. - Normal fetal echo. - NST/BPP weekly starting at 32 weeks.  2. Hx severe preeclampsia. Mild CHTN not requiring medication (Was as high as 147/84 in Feb and 150/88 on ) - BP log not available, reports home BPs 120s/70s - Baseline PELs WNL, Ur/prcr 0.1, UTP wnl - No antihypertensives needed. - Continue ASA 81, advised d/c @~36wks. - Delivery at ~ 38 weeks if remains stable and unless otherwise indicated.  3. Pregnancy - Previously genetic testing for silent alpha thalassemia carrier status discussed. Patient was reassured that her silent carrier status for alpha thalassemia will not entail any clinical significance, partner testing not necessary. - AFP and NIPT low risk. - Continue with PNV.  RTC 2 weeks for office visit, weekly BPP NST

## 2023-08-11 DIAGNOSIS — O24.113 PRE-EXISTING TYPE 2 DIABETES MELLITUS, IN PREGNANCY, THIRD TRIMESTER: ICD-10-CM

## 2023-08-11 DIAGNOSIS — O10.913 UNSPECIFIED PRE-EXISTING HYPERTENSION COMPLICATING PREGNANCY, THIRD TRIMESTER: ICD-10-CM

## 2023-08-11 DIAGNOSIS — Z3A.32 32 WEEKS GESTATION OF PREGNANCY: ICD-10-CM

## 2023-08-11 DIAGNOSIS — O10.919 UNSPECIFIED PRE-EXISTING HYPERTENSION COMPLICATING PREGNANCY, UNSPECIFIED TRIMESTER: ICD-10-CM

## 2023-08-17 ENCOUNTER — OUTPATIENT (OUTPATIENT)
Dept: OUTPATIENT SERVICES | Facility: HOSPITAL | Age: 33
LOS: 1 days | End: 2023-08-17
Payer: MEDICAID

## 2023-08-17 ENCOUNTER — ASOB RESULT (OUTPATIENT)
Age: 33
End: 2023-08-17

## 2023-08-17 ENCOUNTER — NON-APPOINTMENT (OUTPATIENT)
Age: 33
End: 2023-08-17

## 2023-08-17 ENCOUNTER — APPOINTMENT (OUTPATIENT)
Dept: ANTEPARTUM | Facility: CLINIC | Age: 33
End: 2023-08-17
Payer: MEDICAID

## 2023-08-17 DIAGNOSIS — Z34.90 ENCOUNTER FOR SUPERVISION OF NORMAL PREGNANCY, UNSPECIFIED, UNSPECIFIED TRIMESTER: ICD-10-CM

## 2023-08-17 DIAGNOSIS — O09.90 SUPERVISION OF HIGH RISK PREGNANCY, UNSPECIFIED, UNSPECIFIED TRIMESTER: ICD-10-CM

## 2023-08-17 PROCEDURE — 76818 FETAL BIOPHYS PROFILE W/NST: CPT | Mod: 26

## 2023-08-17 PROCEDURE — 76818 FETAL BIOPHYS PROFILE W/NST: CPT

## 2023-08-18 DIAGNOSIS — O24.113 PRE-EXISTING TYPE 2 DIABETES MELLITUS, IN PREGNANCY, THIRD TRIMESTER: ICD-10-CM

## 2023-08-18 DIAGNOSIS — Z3A.33 33 WEEKS GESTATION OF PREGNANCY: ICD-10-CM

## 2023-08-18 DIAGNOSIS — O10.913 UNSPECIFIED PRE-EXISTING HYPERTENSION COMPLICATING PREGNANCY, THIRD TRIMESTER: ICD-10-CM

## 2023-08-21 ENCOUNTER — OUTPATIENT (OUTPATIENT)
Dept: OUTPATIENT SERVICES | Facility: HOSPITAL | Age: 33
LOS: 1 days | End: 2023-08-21
Payer: MEDICAID

## 2023-08-21 ENCOUNTER — APPOINTMENT (OUTPATIENT)
Dept: OBGYN | Facility: CLINIC | Age: 33
End: 2023-08-21
Payer: MEDICAID

## 2023-08-21 VITALS
BODY MASS INDEX: 29.35 KG/M2 | DIASTOLIC BLOOD PRESSURE: 94 MMHG | HEART RATE: 108 BPM | SYSTOLIC BLOOD PRESSURE: 150 MMHG | HEIGHT: 67 IN | WEIGHT: 187 LBS

## 2023-08-21 DIAGNOSIS — Z34.90 ENCOUNTER FOR SUPERVISION OF NORMAL PREGNANCY, UNSPECIFIED, UNSPECIFIED TRIMESTER: ICD-10-CM

## 2023-08-21 LAB
BILIRUB UR QL STRIP: NORMAL
GLUCOSE UR-MCNC: NORMAL
HCG UR QL: 0.2 EU/DL
HGB UR QL STRIP.AUTO: NORMAL
KETONES UR-MCNC: NORMAL
LEUKOCYTE ESTERASE UR QL STRIP: NORMAL
NITRITE UR QL STRIP: NORMAL
PH UR STRIP: 1.01
PROT UR STRIP-MCNC: NORMAL
SP GR UR STRIP: 1.01

## 2023-08-21 PROCEDURE — 87389 HIV-1 AG W/HIV-1&-2 AB AG IA: CPT

## 2023-08-21 PROCEDURE — 81002 URINALYSIS NONAUTO W/O SCOPE: CPT

## 2023-08-21 PROCEDURE — 99213 OFFICE O/P EST LOW 20 MIN: CPT | Mod: TH,25

## 2023-08-21 PROCEDURE — 86780 TREPONEMA PALLIDUM: CPT

## 2023-08-21 PROCEDURE — 85027 COMPLETE CBC AUTOMATED: CPT

## 2023-08-21 PROCEDURE — 83036 HEMOGLOBIN GLYCOSYLATED A1C: CPT

## 2023-08-21 PROCEDURE — 99213 OFFICE O/P EST LOW 20 MIN: CPT

## 2023-08-22 DIAGNOSIS — Z34.90 ENCOUNTER FOR SUPERVISION OF NORMAL PREGNANCY, UNSPECIFIED, UNSPECIFIED TRIMESTER: ICD-10-CM

## 2023-08-22 LAB
ESTIMATED AVERAGE GLUCOSE: 123 MG/DL
HBA1C MFR BLD HPLC: 5.9 %
HIV1+2 AB SPEC QL IA.RAPID: NONREACTIVE
T PALLIDUM AB SER QL IA: NEGATIVE

## 2023-08-23 ENCOUNTER — NON-APPOINTMENT (OUTPATIENT)
Age: 33
End: 2023-08-23

## 2023-08-24 DIAGNOSIS — Z34.90 ENCOUNTER FOR SUPERVISION OF NORMAL PREGNANCY, UNSPECIFIED, UNSPECIFIED TRIMESTER: ICD-10-CM

## 2023-08-25 ENCOUNTER — APPOINTMENT (OUTPATIENT)
Dept: ANTEPARTUM | Facility: CLINIC | Age: 33
End: 2023-08-25
Payer: MEDICAID

## 2023-08-25 ENCOUNTER — ASOB RESULT (OUTPATIENT)
Age: 33
End: 2023-08-25

## 2023-08-25 ENCOUNTER — OUTPATIENT (OUTPATIENT)
Dept: OUTPATIENT SERVICES | Facility: HOSPITAL | Age: 33
LOS: 1 days | End: 2023-08-25
Payer: MEDICAID

## 2023-08-25 VITALS
SYSTOLIC BLOOD PRESSURE: 125 MMHG | OXYGEN SATURATION: 98 % | BODY MASS INDEX: 29.29 KG/M2 | HEART RATE: 76 BPM | DIASTOLIC BLOOD PRESSURE: 81 MMHG | WEIGHT: 187 LBS

## 2023-08-25 DIAGNOSIS — Z34.90 ENCOUNTER FOR SUPERVISION OF NORMAL PREGNANCY, UNSPECIFIED, UNSPECIFIED TRIMESTER: ICD-10-CM

## 2023-08-25 DIAGNOSIS — O09.90 SUPERVISION OF HIGH RISK PREGNANCY, UNSPECIFIED, UNSPECIFIED TRIMESTER: ICD-10-CM

## 2023-08-25 DIAGNOSIS — O09.93 SUPERVISION OF HIGH RISK PREGNANCY, UNSPECIFIED, THIRD TRIMESTER: ICD-10-CM

## 2023-08-25 LAB
BILIRUB UR QL STRIP: NORMAL
BP DIAS: 81 MM HG
BP SYS: 125 MM HG
CLARITY UR: CLEAR
COLLECTION METHOD: NORMAL
FETAL MOVEMENT: PRESENT
GLUCOSE BLDC GLUCOMTR-MCNC: 104
GLUCOSE UR-MCNC: NORMAL
HCG UR QL: 0.2 EU/DL
HGB UR QL STRIP.AUTO: NORMAL
KETONES UR-MCNC: NORMAL
LEUKOCYTE ESTERASE UR QL STRIP: NORMAL
NITRITE UR QL STRIP: NORMAL
OB COMMENTS: NORMAL
PH UR STRIP: 6.5
PROT UR STRIP-MCNC: NORMAL
SCHEDULED VISIT: YES
SP GR UR STRIP: 1.01
URINE ALBUMIN/PROTEIN: NORMAL
URINE GLUCOSE: NORMAL
URINE KETONES: NORMAL
WEEKS GESTATION: 34.3

## 2023-08-25 PROCEDURE — 76818 FETAL BIOPHYS PROFILE W/NST: CPT | Mod: 26,59

## 2023-08-25 PROCEDURE — 76818 FETAL BIOPHYS PROFILE W/NST: CPT

## 2023-08-25 PROCEDURE — 99213 OFFICE O/P EST LOW 20 MIN: CPT | Mod: TH,25

## 2023-08-25 PROCEDURE — 81002 URINALYSIS NONAUTO W/O SCOPE: CPT

## 2023-08-25 PROCEDURE — 76816 OB US FOLLOW-UP PER FETUS: CPT

## 2023-08-25 PROCEDURE — 99213 OFFICE O/P EST LOW 20 MIN: CPT | Mod: 25

## 2023-08-25 PROCEDURE — 76821 MIDDLE CEREBRAL ARTERY ECHO: CPT

## 2023-08-25 PROCEDURE — 76821 MIDDLE CEREBRAL ARTERY ECHO: CPT | Mod: 26,59

## 2023-08-25 PROCEDURE — 76820 UMBILICAL ARTERY ECHO: CPT | Mod: 26,59

## 2023-08-25 PROCEDURE — 76816 OB US FOLLOW-UP PER FETUS: CPT | Mod: 26

## 2023-08-25 PROCEDURE — 82948 REAGENT STRIP/BLOOD GLUCOSE: CPT

## 2023-08-25 PROCEDURE — 76820 UMBILICAL ARTERY ECHO: CPT

## 2023-08-25 PROCEDURE — 82962 GLUCOSE BLOOD TEST: CPT

## 2023-08-25 RX ORDER — CALCIUM CARBONATE 500 MG/1
500 TABLET, CHEWABLE ORAL
Qty: 60 | Refills: 0 | Status: ACTIVE | COMMUNITY
Start: 2023-08-25 | End: 1900-01-01

## 2023-08-25 NOTE — DISCUSSION/SUMMARY
[FreeTextEntry1] : 32y  at 34w3d by LMP 22, EDUARDO 10/3/2023, returns for f/u of DM2. Receives care with Dr. Minaya. Denies LOF, VB. or CTX. Endorses fetal movement.  Reports she is compliant with diabetic diet and insulin regimen. She denies HA, dizziness, scotomata, SOB, chest pain, RUQ/epigastric pain, or LE swelling.  Her history: PMHx: 2020: DM2, Dx'd after her last pregnancy w/GDMA-2. Last Hb A1C on 23 was 8.0%. Past treated with Janumet (Januvia/metformin). PP Preeclampsia (severe), mild chronic hypertension, not requiring medications at this time. Denies Asthma, other illnesses. Surg: denies Meds: Metformin 1000 mg BID started 23 d/c'd . Janumet  mg daily d/c'd 23 Insulin: NPH and Lispro - see dosing below. 2019: Labetalol/Nifedipine continued 1-2mo PP. Allergies: NKDA FHx: SocHx: . Lives w/ and 3 y/o child. English proficiency limited. Recent arrival from [place]. Denies substance abuse x3. OBhx:  2019: , GDMA-2. 10# female. Rx'd Glyburide. Discharged home PPD#10. Postpartum Preeclampsia w/severe features, received magnesium, Labetalol and Nifedipine which she took for 1-2mo then D/C'd by her PMD. Postpartum hemorrhage x2 requiring 5u pRBC, had a "balloon" placed to stop the bleeding. GYN: denies Vaccinations: Influenza: declines. Strong recommendation for flu vaccination in pregnancy reviewed. CoVID: Rec'd J&J in [place]. Declined boosters.  Today: VS: /87-->125/81, HR: 76 bpm , Wt: 187 lbs. O2 sat: 98 %, FHR: f/u sonogram, Office FS: 104 (1hr pp) Udip: mod leuks GA: AOx3, NAD Heart: RRR, no M/R/G Lungs: CTAB Abd: soft, nontender, nondistended LE: no erythema, edema or pain ---------------------  LAB: Opos/AntibNEG; HbEP Not found. 23: 5.8k>12.5/39.3%<410k; mcv 75.3 : TSH 3.1; Fe Sat 14% (decr) labs o/w normal. TIBC 14 (decr). Upr/Cr 0.1  UTP 43 (adequate) Quantiferon neg; SMA: Possible silent carrier.; alpha thal: aa/a-: silent carrier.  OBUS: 3/7/23: Single IUP at 10w0d by LMP, ant plac. CRL 9w4d, normal growth. 3/28/23: Single IUP at 13w0d, CRL 12w4d. NT 0.9mm normal  variable, anterior placenta, MVP: 4.62cm, EFW: 124gm, limited views  breech, anterior placenta no previa, central cord insertion, MVP 4.62cm, 319gm, normal anatomy, limited views  normal f/u anatomy, still some limited cardiac views, MVP 4.97cm, EFW 871gm 21%ile  vtx, EFW 1481 26%ile, MVP 6.32 cm 8/10 vtx, BPP 10/10, MVP 7cm  33w2d BPP 10/10, MVP 6.3cm  vtx, EFW 2237 23%ile; AC 8%, MVP 4.9 cm; BPP 10/10  Impression/Recommendations: 32y  at 34w3d, by LMP 22, for f/u of T2DM.  1. DM2: HbA1c 8% on 23 FS log reviewed from - FFS 75-94 (0/12 elevated), PPFS  (0/36 elevated) CURRENT Insulin Regimen: NPH 20U AM/ 35U with dinner, Admelog /12 - C/w current insulin regimen - Cardiologist and echo scheduled for  +  - Ophthalmology and podiatry consults still pending. - Normal fetal echo. - NST/BPP twice weekly  2. Hx severe preeclampsia. Mild CHTN not requiring medication (Was as high as 147/84 in Feb and 150/88 on ) - Initial BP today 141/87; repeat 125/81 - BP log from the last week 117-125/75-84 (0 elevated values) - Baseline PELs WNL, Ur/prcr 0.1, UTP wnl - No antihypertensives needed. - Continue ASA 81, advised d/c @~36wks. - Delivery at ~ 38 weeks if remains stable and unless otherwise indicated.  3. IUGR - Borderline UA doppler - normal MCA; reassuring  surveillance; will increase to twice weekly.  4. Pregnancy - Previously genetic testing for silent alpha thalassemia carrier status discussed. Patient was reassured that her silent carrier status for alpha thalassemia will not entail any clinical significance, partner testing not necessary. - AFP and NIPT low risk. - Continue with PNV.  RTC 1 week for office visit, twice weekly BPP and NST

## 2023-08-28 ENCOUNTER — ASOB RESULT (OUTPATIENT)
Age: 33
End: 2023-08-28

## 2023-08-28 ENCOUNTER — APPOINTMENT (OUTPATIENT)
Dept: CARDIOLOGY | Facility: CLINIC | Age: 33
End: 2023-08-28
Payer: MEDICAID

## 2023-08-28 ENCOUNTER — NON-APPOINTMENT (OUTPATIENT)
Age: 33
End: 2023-08-28

## 2023-08-28 ENCOUNTER — APPOINTMENT (OUTPATIENT)
Dept: ANTEPARTUM | Facility: CLINIC | Age: 33
End: 2023-08-28
Payer: MEDICAID

## 2023-08-28 ENCOUNTER — OUTPATIENT (OUTPATIENT)
Dept: OUTPATIENT SERVICES | Facility: HOSPITAL | Age: 33
LOS: 1 days | End: 2023-08-28
Payer: MEDICAID

## 2023-08-28 VITALS
BODY MASS INDEX: 30.61 KG/M2 | TEMPERATURE: 96.4 F | HEIGHT: 67 IN | DIASTOLIC BLOOD PRESSURE: 94 MMHG | SYSTOLIC BLOOD PRESSURE: 140 MMHG | HEART RATE: 105 BPM | OXYGEN SATURATION: 100 % | RESPIRATION RATE: 17 BRPM | WEIGHT: 195 LBS

## 2023-08-28 DIAGNOSIS — Z34.90 ENCOUNTER FOR SUPERVISION OF NORMAL PREGNANCY, UNSPECIFIED, UNSPECIFIED TRIMESTER: ICD-10-CM

## 2023-08-28 DIAGNOSIS — Z3A.34 34 WEEKS GESTATION OF PREGNANCY: ICD-10-CM

## 2023-08-28 DIAGNOSIS — O10.919 UNSPECIFIED PRE-EXISTING HYPERTENSION COMPLICATING PREGNANCY, UNSPECIFIED TRIMESTER: ICD-10-CM

## 2023-08-28 DIAGNOSIS — E11.9 TYPE 2 DIABETES MELLITUS W/OUT COMPLICATIONS: ICD-10-CM

## 2023-08-28 DIAGNOSIS — O24.113 PRE-EXISTING TYPE 2 DIABETES MELLITUS, IN PREGNANCY, THIRD TRIMESTER: ICD-10-CM

## 2023-08-28 DIAGNOSIS — O36.5930 MATERNAL CARE FOR OTHER KNOWN OR SUSPECTED POOR FETAL GROWTH, THIRD TRIMESTER, NOT APPLICABLE OR UNSPECIFIED: ICD-10-CM

## 2023-08-28 DIAGNOSIS — O09.299 SUPERVISION OF PREGNANCY WITH OTHER POOR REPRODUCTIVE OR OBSTETRIC HISTORY, UNSPECIFIED TRIMESTER: ICD-10-CM

## 2023-08-28 PROCEDURE — 76818 FETAL BIOPHYS PROFILE W/NST: CPT | Mod: 26

## 2023-08-28 PROCEDURE — 76820 UMBILICAL ARTERY ECHO: CPT | Mod: 26

## 2023-08-28 PROCEDURE — 76820 UMBILICAL ARTERY ECHO: CPT

## 2023-08-28 PROCEDURE — 76821 MIDDLE CEREBRAL ARTERY ECHO: CPT

## 2023-08-28 PROCEDURE — 76821 MIDDLE CEREBRAL ARTERY ECHO: CPT | Mod: 26

## 2023-08-28 PROCEDURE — 99204 OFFICE O/P NEW MOD 45 MIN: CPT | Mod: 25

## 2023-08-28 PROCEDURE — 93000 ELECTROCARDIOGRAM COMPLETE: CPT

## 2023-08-28 PROCEDURE — 76818 FETAL BIOPHYS PROFILE W/NST: CPT

## 2023-08-28 RX ORDER — BLOOD-GLUCOSE METER
70 EACH MISCELLANEOUS
Qty: 120 | Refills: 4 | Status: DISCONTINUED | COMMUNITY
Start: 2023-02-07 | End: 2023-08-28

## 2023-08-28 NOTE — ASSESSMENT
[FreeTextEntry1] : 33 year old woman,  at 34w3d by LMP 22, EDUARDO 10/3/2023, with DM and cHTN, who presents to establish care.   1. cHTN/history of pre-eclampsia: Patient has a history of pre-eclampsia. So far, BP is at goal at home and she is checking accurately. Her goal BP is ,140/90 mmHg and she knows to call me if this is above goal.  - Check echocardiogram to rule out structural abnormalities - We discussed the importance of adhering to a low sodium diet of <2000 mg of sodium per day - Recommended to keep a BP log at home and call me if BP is consistently above goal  2. DM: as per MFM  The primary prevention of heart disease was discussed in detail with the patient, including adhering to a heart healthy, plant based, or Mediterranean diet, and the importance of 30 minutes of moderate intensity activity for 30 minutes, 5 times a week. All the patient's questions were answered.  RTC within 1 month post-partun.

## 2023-08-28 NOTE — HISTORY OF PRESENT ILLNESS
[FreeTextEntry1] : BRENDAN MAN is a 33 year old woman,  at 34w3d by LMP 22, EDUARDO 10/3/2023, with DM and cHTN, who presents to establish care.   The patient denies chest pain, shortness of breath, palpitations and syncope. No leg swelling, orthopnea and PND.  For her cHTN, she is not on any medications at this time. Her BP is at goal at home.   OBhx:  2019: , GDMA-2. 10# female. Rx'd Glyburide. Discharged home PPD#10. Postpartum Preeclampsia w/severe features, received magnesium, Labetalol and Nifedipine which she took for 1-2mo then D/C'd by her PMD. Postpartum hemorrhage x2 requiring 5u pRBC, had a "balloon" placed to stop the bleeding.

## 2023-08-30 ENCOUNTER — APPOINTMENT (OUTPATIENT)
Dept: CARDIOLOGY | Facility: CLINIC | Age: 33
End: 2023-08-30

## 2023-08-30 DIAGNOSIS — O36.5930 MATERNAL CARE FOR OTHER KNOWN OR SUSPECTED POOR FETAL GROWTH, THIRD TRIMESTER, NOT APPLICABLE OR UNSPECIFIED: ICD-10-CM

## 2023-08-30 DIAGNOSIS — O10.013 PRE-EXISTING ESSENTIAL HYPERTENSION COMPLICATING PREGNANCY, THIRD TRIMESTER: ICD-10-CM

## 2023-08-30 DIAGNOSIS — O24.113 PRE-EXISTING TYPE 2 DIABETES MELLITUS, IN PREGNANCY, THIRD TRIMESTER: ICD-10-CM

## 2023-08-30 DIAGNOSIS — O09.299 SUPERVISION OF PREGNANCY WITH OTHER POOR REPRODUCTIVE OR OBSTETRIC HISTORY, UNSPECIFIED TRIMESTER: ICD-10-CM

## 2023-08-30 DIAGNOSIS — Z3A.34 34 WEEKS GESTATION OF PREGNANCY: ICD-10-CM

## 2023-08-31 ENCOUNTER — APPOINTMENT (OUTPATIENT)
Dept: OBGYN | Facility: CLINIC | Age: 33
End: 2023-08-31
Payer: MEDICAID

## 2023-08-31 ENCOUNTER — ASOB RESULT (OUTPATIENT)
Age: 33
End: 2023-08-31

## 2023-08-31 ENCOUNTER — APPOINTMENT (OUTPATIENT)
Dept: ANTEPARTUM | Facility: CLINIC | Age: 33
End: 2023-08-31
Payer: MEDICAID

## 2023-08-31 ENCOUNTER — OUTPATIENT (OUTPATIENT)
Dept: OUTPATIENT SERVICES | Facility: HOSPITAL | Age: 33
LOS: 1 days | End: 2023-08-31
Payer: MEDICAID

## 2023-08-31 VITALS — HEART RATE: 95 BPM

## 2023-08-31 DIAGNOSIS — O10.013 PRE-EXISTING ESSENTIAL HYPERTENSION COMPLICATING PREGNANCY, THIRD TRIMESTER: ICD-10-CM

## 2023-08-31 DIAGNOSIS — O36.5930 MATERNAL CARE FOR OTHER KNOWN OR SUSPECTED POOR FETAL GROWTH, THIRD TRIMESTER, NOT APPLICABLE OR UNSPECIFIED: ICD-10-CM

## 2023-08-31 DIAGNOSIS — Z3A.35 35 WEEKS GESTATION OF PREGNANCY: ICD-10-CM

## 2023-08-31 DIAGNOSIS — O09.90 SUPERVISION OF HIGH RISK PREGNANCY, UNSPECIFIED, UNSPECIFIED TRIMESTER: ICD-10-CM

## 2023-08-31 DIAGNOSIS — Z34.90 ENCOUNTER FOR SUPERVISION OF NORMAL PREGNANCY, UNSPECIFIED, UNSPECIFIED TRIMESTER: ICD-10-CM

## 2023-08-31 DIAGNOSIS — O24.113 PRE-EXISTING TYPE 2 DIABETES MELLITUS, IN PREGNANCY, THIRD TRIMESTER: ICD-10-CM

## 2023-08-31 DIAGNOSIS — O09.299 SUPERVISION OF PREGNANCY WITH OTHER POOR REPRODUCTIVE OR OBSTETRIC HISTORY, UNSPECIFIED TRIMESTER: ICD-10-CM

## 2023-08-31 PROCEDURE — 76821 MIDDLE CEREBRAL ARTERY ECHO: CPT | Mod: 26

## 2023-08-31 PROCEDURE — 76818 FETAL BIOPHYS PROFILE W/NST: CPT | Mod: 26

## 2023-08-31 PROCEDURE — 99213 OFFICE O/P EST LOW 20 MIN: CPT | Mod: TH,95

## 2023-08-31 PROCEDURE — 76821 MIDDLE CEREBRAL ARTERY ECHO: CPT

## 2023-08-31 PROCEDURE — 76818 FETAL BIOPHYS PROFILE W/NST: CPT

## 2023-08-31 PROCEDURE — 76820 UMBILICAL ARTERY ECHO: CPT | Mod: 26

## 2023-09-02 ENCOUNTER — EMERGENCY (EMERGENCY)
Facility: HOSPITAL | Age: 33
LOS: 0 days | Discharge: LEFT BEFORE DISCHARGE | End: 2023-09-03
Attending: EMERGENCY MEDICINE
Payer: MEDICAID

## 2023-09-02 ENCOUNTER — NON-APPOINTMENT (OUTPATIENT)
Age: 33
End: 2023-09-02

## 2023-09-02 VITALS
WEIGHT: 186.95 LBS | HEART RATE: 104 BPM | DIASTOLIC BLOOD PRESSURE: 89 MMHG | HEIGHT: 65 IN | SYSTOLIC BLOOD PRESSURE: 137 MMHG | RESPIRATION RATE: 18 BRPM | OXYGEN SATURATION: 99 % | TEMPERATURE: 98 F

## 2023-09-02 DIAGNOSIS — O16.3 UNSPECIFIED MATERNAL HYPERTENSION, THIRD TRIMESTER: ICD-10-CM

## 2023-09-02 DIAGNOSIS — Z3A.36 36 WEEKS GESTATION OF PREGNANCY: ICD-10-CM

## 2023-09-02 DIAGNOSIS — Z53.21 PROCEDURE AND TREATMENT NOT CARRIED OUT DUE TO PATIENT LEAVING PRIOR TO BEING SEEN BY HEALTH CARE PROVIDER: ICD-10-CM

## 2023-09-02 PROCEDURE — 85025 COMPLETE CBC W/AUTO DIFF WBC: CPT

## 2023-09-02 PROCEDURE — 80053 COMPREHEN METABOLIC PANEL: CPT

## 2023-09-02 PROCEDURE — 85730 THROMBOPLASTIN TIME PARTIAL: CPT

## 2023-09-02 PROCEDURE — 85384 FIBRINOGEN ACTIVITY: CPT

## 2023-09-02 PROCEDURE — 84156 ASSAY OF PROTEIN URINE: CPT

## 2023-09-02 PROCEDURE — 82570 ASSAY OF URINE CREATININE: CPT

## 2023-09-02 PROCEDURE — 81001 URINALYSIS AUTO W/SCOPE: CPT

## 2023-09-02 PROCEDURE — 83615 LACTATE (LD) (LDH) ENZYME: CPT

## 2023-09-02 PROCEDURE — L9991: CPT

## 2023-09-02 PROCEDURE — 85610 PROTHROMBIN TIME: CPT

## 2023-09-02 PROCEDURE — 36415 COLL VENOUS BLD VENIPUNCTURE: CPT

## 2023-09-02 PROCEDURE — 84550 ASSAY OF BLOOD/URIC ACID: CPT

## 2023-09-03 ENCOUNTER — OUTPATIENT (OUTPATIENT)
Dept: INPATIENT UNIT | Facility: HOSPITAL | Age: 33
LOS: 1 days | Discharge: HOME | End: 2023-09-03
Payer: MEDICAID

## 2023-09-03 VITALS — DIASTOLIC BLOOD PRESSURE: 60 MMHG | SYSTOLIC BLOOD PRESSURE: 111 MMHG | HEART RATE: 51 BPM

## 2023-09-03 DIAGNOSIS — Z3A.00 WEEKS OF GESTATION OF PREGNANCY NOT SPECIFIED: ICD-10-CM

## 2023-09-03 DIAGNOSIS — Z3A.35 35 WEEKS GESTATION OF PREGNANCY: ICD-10-CM

## 2023-09-03 DIAGNOSIS — O10.913 UNSPECIFIED PRE-EXISTING HYPERTENSION COMPLICATING PREGNANCY, THIRD TRIMESTER: ICD-10-CM

## 2023-09-03 DIAGNOSIS — O26.899 OTHER SPECIFIED PREGNANCY RELATED CONDITIONS, UNSPECIFIED TRIMESTER: ICD-10-CM

## 2023-09-03 DIAGNOSIS — Z87.59 PERSONAL HISTORY OF OTHER COMPLICATIONS OF PREGNANCY, CHILDBIRTH AND THE PUERPERIUM: ICD-10-CM

## 2023-09-03 DIAGNOSIS — O24.113 PRE-EXISTING TYPE 2 DIABETES MELLITUS, IN PREGNANCY, THIRD TRIMESTER: ICD-10-CM

## 2023-09-03 DIAGNOSIS — O36.5930 MATERNAL CARE FOR OTHER KNOWN OR SUSPECTED POOR FETAL GROWTH, THIRD TRIMESTER, NOT APPLICABLE OR UNSPECIFIED: ICD-10-CM

## 2023-09-03 DIAGNOSIS — E11.9 TYPE 2 DIABETES MELLITUS WITHOUT COMPLICATIONS: ICD-10-CM

## 2023-09-03 LAB
ALBUMIN SERPL ELPH-MCNC: 3.4 G/DL — LOW (ref 3.5–5.2)
ALP SERPL-CCNC: 198 U/L — HIGH (ref 30–115)
ALT FLD-CCNC: 10 U/L — SIGNIFICANT CHANGE UP (ref 0–41)
ANION GAP SERPL CALC-SCNC: 12 MMOL/L — SIGNIFICANT CHANGE UP (ref 7–14)
APPEARANCE UR: CLEAR — SIGNIFICANT CHANGE UP
APTT BLD: 28 SEC — SIGNIFICANT CHANGE UP (ref 27–39.2)
AST SERPL-CCNC: 12 U/L — SIGNIFICANT CHANGE UP (ref 0–41)
BASOPHILS # BLD AUTO: 0.03 K/UL — SIGNIFICANT CHANGE UP (ref 0–0.2)
BASOPHILS NFR BLD AUTO: 0.5 % — SIGNIFICANT CHANGE UP (ref 0–1)
BILIRUB SERPL-MCNC: <0.2 MG/DL — SIGNIFICANT CHANGE UP (ref 0.2–1.2)
BILIRUB UR-MCNC: NEGATIVE — SIGNIFICANT CHANGE UP
BUN SERPL-MCNC: 13 MG/DL — SIGNIFICANT CHANGE UP (ref 10–20)
CALCIUM SERPL-MCNC: 8.9 MG/DL — SIGNIFICANT CHANGE UP (ref 8.4–10.5)
CHLORIDE SERPL-SCNC: 106 MMOL/L — SIGNIFICANT CHANGE UP (ref 98–110)
CO2 SERPL-SCNC: 18 MMOL/L — SIGNIFICANT CHANGE UP (ref 17–32)
COLOR SPEC: YELLOW — SIGNIFICANT CHANGE UP
CREAT ?TM UR-MCNC: 30 MG/DL — SIGNIFICANT CHANGE UP
CREAT SERPL-MCNC: 0.7 MG/DL — SIGNIFICANT CHANGE UP (ref 0.7–1.5)
DIFF PNL FLD: NEGATIVE — SIGNIFICANT CHANGE UP
EGFR: 119 ML/MIN/1.73M2 — SIGNIFICANT CHANGE UP
EOSINOPHIL # BLD AUTO: 0.06 K/UL — SIGNIFICANT CHANGE UP (ref 0–0.7)
EOSINOPHIL NFR BLD AUTO: 1.1 % — SIGNIFICANT CHANGE UP (ref 0–8)
FIBRINOGEN PPP-MCNC: 583 MG/DL — HIGH (ref 204.4–570.6)
GLUCOSE BLDC GLUCOMTR-MCNC: 99 MG/DL — SIGNIFICANT CHANGE UP (ref 70–99)
GLUCOSE SERPL-MCNC: 93 MG/DL — SIGNIFICANT CHANGE UP (ref 70–99)
GLUCOSE UR QL: NEGATIVE MG/DL — SIGNIFICANT CHANGE UP
HCT VFR BLD CALC: 37.5 % — SIGNIFICANT CHANGE UP (ref 37–47)
HGB BLD-MCNC: 12.2 G/DL — SIGNIFICANT CHANGE UP (ref 12–16)
IMM GRANULOCYTES NFR BLD AUTO: 0.9 % — HIGH (ref 0.1–0.3)
INR BLD: 0.86 RATIO — SIGNIFICANT CHANGE UP (ref 0.65–1.3)
KETONES UR-MCNC: NEGATIVE MG/DL — SIGNIFICANT CHANGE UP
LDH SERPL L TO P-CCNC: 145 — SIGNIFICANT CHANGE UP (ref 50–242)
LEUKOCYTE ESTERASE UR-ACNC: ABNORMAL
LYMPHOCYTES # BLD AUTO: 1.7 K/UL — SIGNIFICANT CHANGE UP (ref 1.2–3.4)
LYMPHOCYTES # BLD AUTO: 30.1 % — SIGNIFICANT CHANGE UP (ref 20.5–51.1)
MCHC RBC-ENTMCNC: 24.6 PG — LOW (ref 27–31)
MCHC RBC-ENTMCNC: 32.5 G/DL — SIGNIFICANT CHANGE UP (ref 32–37)
MCV RBC AUTO: 75.6 FL — LOW (ref 81–99)
MONOCYTES # BLD AUTO: 0.63 K/UL — HIGH (ref 0.1–0.6)
MONOCYTES NFR BLD AUTO: 11.2 % — HIGH (ref 1.7–9.3)
NEUTROPHILS # BLD AUTO: 3.18 K/UL — SIGNIFICANT CHANGE UP (ref 1.4–6.5)
NEUTROPHILS NFR BLD AUTO: 56.2 % — SIGNIFICANT CHANGE UP (ref 42.2–75.2)
NITRITE UR-MCNC: NEGATIVE — SIGNIFICANT CHANGE UP
NRBC # BLD: 0 /100 WBCS — SIGNIFICANT CHANGE UP (ref 0–0)
PH UR: 7 — SIGNIFICANT CHANGE UP (ref 5–8)
PLATELET # BLD AUTO: 180 K/UL — SIGNIFICANT CHANGE UP (ref 130–400)
PMV BLD: 12.6 FL — HIGH (ref 7.4–10.4)
POTASSIUM SERPL-MCNC: 4.1 MMOL/L — SIGNIFICANT CHANGE UP (ref 3.5–5)
POTASSIUM SERPL-SCNC: 4.1 MMOL/L — SIGNIFICANT CHANGE UP (ref 3.5–5)
PROT ?TM UR-MCNC: <5 MG/DLG/24H — SIGNIFICANT CHANGE UP
PROT ?TM UR-MCNC: <5 MG/DLG/24H — SIGNIFICANT CHANGE UP
PROT SERPL-MCNC: 6.1 G/DL — SIGNIFICANT CHANGE UP (ref 6–8)
PROT UR-MCNC: NEGATIVE MG/DL — SIGNIFICANT CHANGE UP
PROT/CREAT UR-RTO: <0.2 RATIO — SIGNIFICANT CHANGE UP (ref 0–0.2)
PROTHROM AB SERPL-ACNC: 9.8 SEC — LOW (ref 9.95–12.87)
RBC # BLD: 4.96 M/UL — SIGNIFICANT CHANGE UP (ref 4.2–5.4)
RBC # FLD: 15.4 % — HIGH (ref 11.5–14.5)
SODIUM SERPL-SCNC: 136 MMOL/L — SIGNIFICANT CHANGE UP (ref 135–146)
SP GR SPEC: 1.01 — SIGNIFICANT CHANGE UP (ref 1–1.03)
URATE SERPL-MCNC: 5.9 MG/DL — SIGNIFICANT CHANGE UP (ref 2.5–7)
UROBILINOGEN FLD QL: 0.2 MG/DL — SIGNIFICANT CHANGE UP (ref 0.2–1)
WBC # BLD: 5.65 K/UL — SIGNIFICANT CHANGE UP (ref 4.8–10.8)
WBC # FLD AUTO: 5.65 K/UL — SIGNIFICANT CHANGE UP (ref 4.8–10.8)

## 2023-09-03 PROCEDURE — 99214 OFFICE O/P EST MOD 30 MIN: CPT

## 2023-09-03 PROCEDURE — 76815 OB US LIMITED FETUS(S): CPT | Mod: 26

## 2023-09-03 PROCEDURE — 99215 OFFICE O/P EST HI 40 MIN: CPT | Mod: TH,25

## 2023-09-03 PROCEDURE — 59025 FETAL NON-STRESS TEST: CPT | Mod: 26

## 2023-09-03 PROCEDURE — 82962 GLUCOSE BLOOD TEST: CPT

## 2023-09-03 PROCEDURE — 99417 PROLNG OP E/M EACH 15 MIN: CPT | Mod: 25

## 2023-09-03 NOTE — OB PROVIDER TRIAGE NOTE - ATTENDING COMMENTS
Patient seen and examined at bedside in [facility] for prolonged observation for r/o superimposed pre-eclampsia. Patient with known mild chronic HTN no on any meds with self-reported higher BP's at home that usual. BP's here is mild range and labs WNL, urine protein/creatinine ratio still pending. Patient also with T2DM on insulin. SHe is following closely with MFM and  testing twice weekly. Reassuring fetal and maternal well being for d/c home and f/u as scheduled. Pre-eclamptic precautions given.

## 2023-09-03 NOTE — ED ADULT NURSE NOTE - OBJECTIVE STATEMENT
Patient 36weeks pregnant and states she has high blood pressure - home readings are 150/95. BP rechecked at triage: 137/89.

## 2023-09-03 NOTE — OB PROVIDER TRIAGE NOTE - NSHPLABSRESULTS_GEN_ALL_CORE
8/21/23  syphilis nr  HIV nr    4/20/23  verifi normal    3/3/23  SMA possible silent carrier    2/25/23  HbsAb reactive   HbsAg nr  HepC nr  rubella immune  measles immune  syphilis neg  antibody neg  O pos  HIV nr  quant neg    sonograms:  3/7/23: Single IUP at 10w0d by LMP, ant plac. CRL 9w4d, normal growth.  3/28/23: Single IUP at 13w0d, CRL 12w4d. NT 0.9mm normal  4/20 variable, anterior placenta, MVP: 4.62cm, EFW: 124gm, limited views  5/16 breech, anterior placenta no previa, central cord insertion, MVP 4.62cm, 319gm, normal anatomy, limited views  6/30 normal f/u anatomy, still some limited cardiac views, MVP 4.97cm, EFW 871gm 21%ile  07/27 vtx, EFW 1481 26%ile, MVP 6.32 cm  8/10 vtx, BPP 10/10, MVP 7cm  8/17 33w2d BPP 10/10, MVP 6.3cm  8/25 vtx, EFW 2237 23%ile; AC 8%, MVP 4.9 cm; BPP 10/10

## 2023-09-03 NOTE — OB PROVIDER TRIAGE NOTE - NSOBPROVIDERNOTE_OBGYN_ALL_OB_FT
34yo  at 35w5d, GBS unknown, measured elevated BP at home, BPs in triage mostly normal with occasional elevated, no severe, reassuring maternal and fetal status.     -continue to observe  -continuous EFM/toco/BPs  -RTC to [facility] for weekly monitoring  - labor precautions    Dr. Wang aware, Dr. Negrete to be made aware. 32yo  at 35w5d, GBS unknown, measured elevated BP at home, BPs in triage mostly normal with occasional elevated (113-154/), no severe-range, reassuring maternal and fetal status (BPP 10/10).     -continue to observe  -continuous EFM/toco/BPs  -f/u preeclampsia lab results    Dr. Wang aware, Dr. Negrete aware. 32yo  at 35w5d, GBS unknown, measured elevated BP at home, BPs in triage mostly normal with occasional elevated (113-154/), no severe-range, reassuring maternal and fetal status (BPP 10/10).     -encourage po hydration  -f/u MFM appointment this week  - labor precautions    Dr. Wang aware, Dr. Negrete aware.      ADDENDUM  pt reassessed at bedside, continues to deny symptoms of preeclampsia, BPs non-elevated, bloodwork WNL. discussed symptoms of preeclampsia and return precautions. advised to f/u outpatient. d/c to home.

## 2023-09-03 NOTE — OB PROVIDER TRIAGE NOTE - HISTORY OF PRESENT ILLNESS
34yo  at 35w5d by LMP c/w early sonogram, here for elevated BP on home BP cuff. Denies contractions, vaginal bleeding, leakage of fluid, reports good fetal movement. Denies cp/sob, ruq/epigastric pain, calf-swelling or tenderness. Pregnancy c/b cHTN and T2DM, h/o preeclampsia with severe features in prior pregnancy. GBS unknown.  32yo  at 35w5d by LMP c/w early sonogram, here for elevated BP on home BP cuff. Denies contractions, vaginal bleeding, leakage of fluid, reports good fetal movement. Denies cp/sob, ruq/epigastric pain, calf-swelling or tenderness. Pregnancy c/b cHTN and T2DM, h/o preeclampsia with severe features in prior pregnancy. GBS unknown.     1. DM2: HbA1c 8% on 23  FS log reviewed from - FFS 75-94 (0/12 elevated), PPFS  (0/36 elevated)  CURRENT Insulin Regimen: NPH 20U AM/ 35U with dinner, Admelog   - C/w current insulin regimen  - Cardiologist and echo scheduled for  +   - Ophthalmology and podiatry consults still pending.  - Normal fetal echo.  - NST/BPP twice weekly  ?  2. Hx severe preeclampsia. Mild CHTN not requiring medication (Was as high as 147/84 in Feb and 150/88 on )  - Initial BP today 141/87; repeat 125/81  - BP log from the last week 117-125/75-84 (0 elevated values)  - Baseline PELs WNL, Ur/prcr 0.1, UTP wnl  - No antihypertensives needed.  - Continue ASA 81, advised d/c @~36wks.  - Delivery at ~ 38 weeks if remains stable and unless otherwise indicated.  ?  3. IUGR - Borderline UA doppler - normal MCA; reassuring  surveillance; will increase to twice weekly. 34yo  at 35w5d by LMP c/w early sonogram, here for elevated BP on home BP cuff. Denies contractions, vaginal bleeding, leakage of fluid, reports good fetal movement. Denies cp/sob, ruq/epigastric pain, calf-swelling or tenderness. Pregnancy c/b cHTN and T2DM, h/o preeclampsia with severe features in prior pregnancy. GBS unknown.     1. DM2: HbA1c 8% on 23  FS log reviewed from - FFS 75-94 (0/12 elevated), PPFS  (0/36 elevated)  CURRENT Insulin Regimen: NPH 20U AM/ 35U with dinner, Admelog   - C/w current insulin regimen  - Ophthalmology and podiatry consults still pending.  - Normal fetal echo.  - NST/BPP twice weekly  ?  2. Hx severe preeclampsia. Mild CHTN not requiring medication (Was as high as 147/84 in Feb and 150/88 on )  - Initial BP today 141/87; repeat 125/81  - BP log from the last week 117-125/75-84 (0 elevated values)  - Baseline PELs WNL, Ur/prcr 0.1, UTP wnl  - No antihypertensives needed.  - Continue ASA 81, advised d/c @~36wks.  - Delivery at ~ 38 weeks if remains stable and unless otherwise indicated.  ?  3. IUGR - Borderline UA doppler - normal MCA; reassuring  surveillance; will increase to twice weekly.

## 2023-09-03 NOTE — OB PROVIDER TRIAGE NOTE - NS_OBGYNHISTORY_OBGYN_ALL_OB_FT
Ob Hx:   1x ft , f, 10lb[place] [facility] [place], since then dx with T2DM    Gyn Hx  denies fibroids, cysts, abn paps, STIs

## 2023-09-03 NOTE — OB PROVIDER TRIAGE NOTE - TIME BILLING
Pt presented to triage and was evaluated starting at 01:51 AM. The fetal heart rate monitor ended at 04:34AM . I spent 163 mintues caring for the patient. It included history, preforming an examination, continuously monitored strip, ordering and reviewing labs, documenting in medical record and a discussion with the patient.

## 2023-09-03 NOTE — OB PROVIDER TRIAGE NOTE - NSHPPHYSICALEXAM_GEN_ALL_CORE
Vital Signs Last 24 Hrs  T(C): 36.6 (03 Sep 2023 01:45), Max: 36.7 (02 Sep 2023 23:51)  T(F): 97.8 (03 Sep 2023 01:45), Max: 98 (02 Sep 2023 23:51)  HR: 61 (03 Sep 2023 03:01) (56 - 104)  BP: 113/66 (03 Sep 2023 03:01) (113/66 - 154/93)  RR: 14 (03 Sep 2023 01:45) (14 - 18)  SpO2: 99% (02 Sep 2023 23:51) (99% - 99%)    Gen: aaox3  Abd: soft, gravid, nontender, no palpable contractions  EFM: 135/mod/pos accels  [place]: irregular  Sono: anterior placenta, cephalic, BPP , MVP 4.5 Vital Signs Last 24 Hrs  T(C): 36.6 (03 Sep 2023 01:45), Max: 36.7 (02 Sep 2023 23:51)  T(F): 97.8 (03 Sep 2023 01:45), Max: 98 (02 Sep 2023 23:51)  HR: 61 (03 Sep 2023 03:01) (56 - 104)  BP: 113/66 (03 Sep 2023 03:01) (113/66 - 154/93)  RR: 14 (03 Sep 2023 01:45) (14 - 18)  SpO2: 99% (02 Sep 2023 23:51) (99% - 99%)    Gen: aaox3  Abd: soft, gravid, nontender, no palpable contractions  EFM: 135/mod/pos accels  [place]: irregular  Sono: anterior placenta, cephalic, BPP 8/8, MVP 4.5

## 2023-09-03 NOTE — OB PROVIDER TRIAGE NOTE - ADDITIONAL INSTRUCTIONS
Please continue to monitor your blood pressures 2x per day while at rest. If your blood pressure is more than 160 on top, or 110 on the bottom, please call your doctor and come to labor and delivery. If you have a headache that will not go away, vision changes, chest pain, shortness of breath, pain in the upper right part of your belly, or new and increasing lower leg swelling, please call your doctor and come to labor and delivery. Please follow up with your provider in 1 week for a blood pressure check. Please continue to take any blood pressure medications your doctor has prescribed for you.

## 2023-09-03 NOTE — OB PROVIDER TRIAGE NOTE - NSICDXFAMILYHX_GEN_ALL_CORE_FT
FAMILY HISTORY:  Father  Still living? Unknown  FH: diabetes mellitus, Age at diagnosis: Age Unknown  FH: HTN (hypertension), Age at diagnosis: Age Unknown    Mother  Still living? Unknown  FH: diabetes mellitus, Age at diagnosis: Age Unknown

## 2023-09-03 NOTE — ED ADULT NURSE NOTE - NSFALLUNIVINTERV_ED_ALL_ED
Bed/Stretcher in lowest position, wheels locked, appropriate side rails in place/Call bell, personal items and telephone in reach/Instruct patient to call for assistance before getting out of bed/chair/stretcher/Non-slip footwear applied when patient is off stretcher/[place] to call system/Physically safe environment - no spills, clutter or unnecessary equipment/Purposeful proactive rounding/Room/bathroom lighting operational, light cord in reach

## 2023-09-05 ENCOUNTER — OUTPATIENT (OUTPATIENT)
Dept: OUTPATIENT SERVICES | Facility: HOSPITAL | Age: 33
LOS: 1 days | End: 2023-09-05
Payer: MEDICAID

## 2023-09-05 ENCOUNTER — ASOB RESULT (OUTPATIENT)
Age: 33
End: 2023-09-05

## 2023-09-05 ENCOUNTER — INPATIENT (INPATIENT)
Facility: HOSPITAL | Age: 33
LOS: 1 days | Discharge: HOME | DRG: 560 | End: 2023-09-07
Attending: OBSTETRICS & GYNECOLOGY | Admitting: OBSTETRICS & GYNECOLOGY
Payer: MEDICAID

## 2023-09-05 ENCOUNTER — APPOINTMENT (OUTPATIENT)
Dept: ANTEPARTUM | Facility: CLINIC | Age: 33
End: 2023-09-05
Payer: MEDICAID

## 2023-09-05 ENCOUNTER — NON-APPOINTMENT (OUTPATIENT)
Age: 33
End: 2023-09-05

## 2023-09-05 VITALS — HEART RATE: 90 BPM | DIASTOLIC BLOOD PRESSURE: 86 MMHG | SYSTOLIC BLOOD PRESSURE: 146 MMHG

## 2023-09-05 DIAGNOSIS — O42.92 FULL-TERM PREMATURE RUPTURE OF MEMBRANES, UNSPECIFIED AS TO LENGTH OF TIME BETWEEN RUPTURE AND ONSET OF LABOR: ICD-10-CM

## 2023-09-05 DIAGNOSIS — Z34.90 ENCOUNTER FOR SUPERVISION OF NORMAL PREGNANCY, UNSPECIFIED, UNSPECIFIED TRIMESTER: ICD-10-CM

## 2023-09-05 LAB
ALBUMIN SERPL ELPH-MCNC: 3.5 G/DL — SIGNIFICANT CHANGE UP (ref 3.5–5.2)
ALP SERPL-CCNC: 205 U/L — HIGH (ref 30–115)
ALT FLD-CCNC: 9 U/L — SIGNIFICANT CHANGE UP (ref 0–41)
ANION GAP SERPL CALC-SCNC: 13 MMOL/L — SIGNIFICANT CHANGE UP (ref 7–14)
APTT BLD: 27.1 SEC — SIGNIFICANT CHANGE UP (ref 27–39.2)
AST SERPL-CCNC: 21 U/L — SIGNIFICANT CHANGE UP (ref 0–41)
BASOPHILS # BLD AUTO: 0.02 K/UL — SIGNIFICANT CHANGE UP (ref 0–0.2)
BASOPHILS NFR BLD AUTO: 0.3 % — SIGNIFICANT CHANGE UP (ref 0–1)
BILIRUB SERPL-MCNC: <0.2 MG/DL — SIGNIFICANT CHANGE UP (ref 0.2–1.2)
BUN SERPL-MCNC: 11 MG/DL — SIGNIFICANT CHANGE UP (ref 10–20)
CALCIUM SERPL-MCNC: 9.1 MG/DL — SIGNIFICANT CHANGE UP (ref 8.4–10.4)
CHLORIDE SERPL-SCNC: 105 MMOL/L — SIGNIFICANT CHANGE UP (ref 98–110)
CO2 SERPL-SCNC: 17 MMOL/L — SIGNIFICANT CHANGE UP (ref 17–32)
CREAT SERPL-MCNC: 0.6 MG/DL — LOW (ref 0.7–1.5)
EGFR: 123 ML/MIN/1.73M2 — SIGNIFICANT CHANGE UP
EOSINOPHIL # BLD AUTO: 0.05 K/UL — SIGNIFICANT CHANGE UP (ref 0–0.7)
EOSINOPHIL NFR BLD AUTO: 0.8 % — SIGNIFICANT CHANGE UP (ref 0–8)
FIBRINOGEN PPP-MCNC: 594 MG/DL — HIGH (ref 204.4–570.6)
GLUCOSE BLDC GLUCOMTR-MCNC: 73 MG/DL — SIGNIFICANT CHANGE UP (ref 70–99)
GLUCOSE BLDC GLUCOMTR-MCNC: 83 MG/DL — SIGNIFICANT CHANGE UP (ref 70–99)
GLUCOSE SERPL-MCNC: 85 MG/DL — SIGNIFICANT CHANGE UP (ref 70–99)
HCT VFR BLD CALC: 37.8 % — SIGNIFICANT CHANGE UP (ref 37–47)
HGB BLD-MCNC: 12.2 G/DL — SIGNIFICANT CHANGE UP (ref 12–16)
HIV 1 & 2 AB SERPL IA.RAPID: SIGNIFICANT CHANGE UP
IMM GRANULOCYTES NFR BLD AUTO: 0.7 % — HIGH (ref 0.1–0.3)
INR BLD: 0.86 RATIO — SIGNIFICANT CHANGE UP (ref 0.65–1.3)
LDH SERPL L TO P-CCNC: 256 — HIGH (ref 50–242)
LYMPHOCYTES # BLD AUTO: 1.48 K/UL — SIGNIFICANT CHANGE UP (ref 1.2–3.4)
LYMPHOCYTES # BLD AUTO: 24.9 % — SIGNIFICANT CHANGE UP (ref 20.5–51.1)
MCHC RBC-ENTMCNC: 24.1 PG — LOW (ref 27–31)
MCHC RBC-ENTMCNC: 32.3 G/DL — SIGNIFICANT CHANGE UP (ref 32–37)
MCV RBC AUTO: 74.6 FL — LOW (ref 81–99)
MONOCYTES # BLD AUTO: 0.52 K/UL — SIGNIFICANT CHANGE UP (ref 0.1–0.6)
MONOCYTES NFR BLD AUTO: 8.8 % — SIGNIFICANT CHANGE UP (ref 1.7–9.3)
NEUTROPHILS # BLD AUTO: 3.83 K/UL — SIGNIFICANT CHANGE UP (ref 1.4–6.5)
NEUTROPHILS NFR BLD AUTO: 64.5 % — SIGNIFICANT CHANGE UP (ref 42.2–75.2)
NRBC # BLD: 0 /100 WBCS — SIGNIFICANT CHANGE UP (ref 0–0)
PLATELET # BLD AUTO: 215 K/UL — SIGNIFICANT CHANGE UP (ref 130–400)
PMV BLD: 13.1 FL — HIGH (ref 7.4–10.4)
POTASSIUM SERPL-MCNC: 4.3 MMOL/L — SIGNIFICANT CHANGE UP (ref 3.5–5)
POTASSIUM SERPL-SCNC: 4.3 MMOL/L — SIGNIFICANT CHANGE UP (ref 3.5–5)
PRENATAL SYPHILIS TEST: SIGNIFICANT CHANGE UP
PROT SERPL-MCNC: 6.4 G/DL — SIGNIFICANT CHANGE UP (ref 6–8)
PROTHROM AB SERPL-ACNC: 9.8 SEC — LOW (ref 9.95–12.87)
RBC # BLD: 5.07 M/UL — SIGNIFICANT CHANGE UP (ref 4.2–5.4)
RBC # FLD: 15.6 % — HIGH (ref 11.5–14.5)
SODIUM SERPL-SCNC: 135 MMOL/L — SIGNIFICANT CHANGE UP (ref 135–146)
URATE SERPL-MCNC: 5.1 MG/DL — SIGNIFICANT CHANGE UP (ref 2.5–7)
WBC # BLD: 5.94 K/UL — SIGNIFICANT CHANGE UP (ref 4.8–10.8)
WBC # FLD AUTO: 5.94 K/UL — SIGNIFICANT CHANGE UP (ref 4.8–10.8)

## 2023-09-05 PROCEDURE — 59050 FETAL MONITOR W/REPORT: CPT

## 2023-09-05 PROCEDURE — 76816 OB US FOLLOW-UP PER FETUS: CPT

## 2023-09-05 PROCEDURE — 85730 THROMBOPLASTIN TIME PARTIAL: CPT

## 2023-09-05 PROCEDURE — 86850 RBC ANTIBODY SCREEN: CPT

## 2023-09-05 PROCEDURE — 86900 BLOOD TYPING SEROLOGIC ABO: CPT

## 2023-09-05 PROCEDURE — 76820 UMBILICAL ARTERY ECHO: CPT | Mod: 26

## 2023-09-05 PROCEDURE — 83615 LACTATE (LD) (LDH) ENZYME: CPT

## 2023-09-05 PROCEDURE — 76821 MIDDLE CEREBRAL ARTERY ECHO: CPT

## 2023-09-05 PROCEDURE — 86592 SYPHILIS TEST NON-TREP QUAL: CPT

## 2023-09-05 PROCEDURE — 76820 UMBILICAL ARTERY ECHO: CPT

## 2023-09-05 PROCEDURE — 85384 FIBRINOGEN ACTIVITY: CPT

## 2023-09-05 PROCEDURE — 76818 FETAL BIOPHYS PROFILE W/NST: CPT | Mod: 26

## 2023-09-05 PROCEDURE — 76821 MIDDLE CEREBRAL ARTERY ECHO: CPT | Mod: 26

## 2023-09-05 PROCEDURE — 86703 HIV-1/HIV-2 1 RESULT ANTBDY: CPT

## 2023-09-05 PROCEDURE — 76816 OB US FOLLOW-UP PER FETUS: CPT | Mod: 26

## 2023-09-05 PROCEDURE — 82962 GLUCOSE BLOOD TEST: CPT

## 2023-09-05 PROCEDURE — 84550 ASSAY OF BLOOD/URIC ACID: CPT

## 2023-09-05 PROCEDURE — 85610 PROTHROMBIN TIME: CPT

## 2023-09-05 PROCEDURE — 80053 COMPREHEN METABOLIC PANEL: CPT

## 2023-09-05 PROCEDURE — 88307 TISSUE EXAM BY PATHOLOGIST: CPT

## 2023-09-05 PROCEDURE — 86901 BLOOD TYPING SEROLOGIC RH(D): CPT

## 2023-09-05 PROCEDURE — 36415 COLL VENOUS BLD VENIPUNCTURE: CPT

## 2023-09-05 PROCEDURE — 76818 FETAL BIOPHYS PROFILE W/NST: CPT

## 2023-09-05 PROCEDURE — 85025 COMPLETE CBC W/AUTO DIFF WBC: CPT

## 2023-09-05 RX ORDER — OXYTOCIN 10 UNIT/ML
333.33 VIAL (ML) INJECTION
Qty: 20 | Refills: 0 | Status: DISCONTINUED | OUTPATIENT
Start: 2023-09-05 | End: 2023-09-07

## 2023-09-05 RX ORDER — INFLUENZA VIRUS VACCINE 15; 15; 15; 15 UG/.5ML; UG/.5ML; UG/.5ML; UG/.5ML
0.5 SUSPENSION INTRAMUSCULAR ONCE
Refills: 0 | Status: DISCONTINUED | OUTPATIENT
Start: 2023-09-05 | End: 2023-09-06

## 2023-09-05 RX ORDER — AMPICILLIN TRIHYDRATE 250 MG
2 CAPSULE ORAL ONCE
Refills: 0 | Status: COMPLETED | OUTPATIENT
Start: 2023-09-05 | End: 2023-09-05

## 2023-09-05 RX ORDER — CITRIC ACID/SODIUM CITRATE 300-500 MG
15 SOLUTION, ORAL ORAL EVERY 6 HOURS
Refills: 0 | Status: DISCONTINUED | OUTPATIENT
Start: 2023-09-05 | End: 2023-09-06

## 2023-09-05 RX ORDER — CHLORHEXIDINE GLUCONATE 213 G/1000ML
1 SOLUTION TOPICAL DAILY
Refills: 0 | Status: DISCONTINUED | OUTPATIENT
Start: 2023-09-05 | End: 2023-09-05

## 2023-09-05 RX ORDER — AMPICILLIN TRIHYDRATE 250 MG
1 CAPSULE ORAL EVERY 4 HOURS
Refills: 0 | Status: DISCONTINUED | OUTPATIENT
Start: 2023-09-05 | End: 2023-09-06

## 2023-09-05 RX ORDER — OXYTOCIN 10 UNIT/ML
2 VIAL (ML) INJECTION
Qty: 30 | Refills: 0 | Status: DISCONTINUED | OUTPATIENT
Start: 2023-09-05 | End: 2023-09-07

## 2023-09-05 RX ORDER — SODIUM CHLORIDE 9 MG/ML
1000 INJECTION, SOLUTION INTRAVENOUS
Refills: 0 | Status: DISCONTINUED | OUTPATIENT
Start: 2023-09-05 | End: 2023-09-06

## 2023-09-05 RX ADMIN — Medication 200 GRAM(S): at 19:40

## 2023-09-05 RX ADMIN — Medication 2 MILLIUNIT(S)/MIN: at 22:07

## 2023-09-05 RX ADMIN — Medication 108 GRAM(S): at 23:46

## 2023-09-05 NOTE — OB PROVIDER H&P - NSHPLABSRESULTS_GEN_ALL_CORE
LAB: Opos/AntibNEG; HbEP Not found.  2/2/23: 5.8k>12.5/39.3%<410k; mcv 75.3  2/27: TSH 3.1; Fe Sat 14% (decr) labs o/w normal. TIBC 14 (decr). Upr/Cr 0.1  7/17 UTP 43 (adequate)  Quantiferon neg; SMA: Possible silent carrier.; alpha thal: aa/a-: silent carrier.  ?  OBUS:  3/7/23: Single IUP at 10w0d by LMP, ant plac. CRL 9w4d, normal growth.  3/28/23: Single IUP at 13w0d, CRL 12w4d. NT 0.9mm normal  4/20 variable, anterior placenta, MVP: 4.62cm, EFW: 124gm, limited views  5/16 breech, anterior placenta no previa, central cord insertion, MVP 4.62cm, 319gm, normal anatomy, limited views  6/30 normal f/u anatomy, still some limited cardiac views, MVP 4.97cm, EFW 871gm 21%ile  07/27 vtx, EFW 1481 26%ile, MVP 6.32 cm  8/10 vtx, BPP 10/10, MVP 7cm  8/17 33w2d BPP 10/10, MVP 6.3cm  8/25 vtx, EFW 2237 23%ile; AC 8%, MVP 4.9 cm; BPP 10/10  9/5: 2587g (26%), AC 8%, UA dopplers 3.78 (elevated), vertex,

## 2023-09-05 NOTE — OB PROVIDER H&P - ASSESSMENT
JENNIFER TO FINISH 32yo  @36w0d, GBS unknown, cHTN, T2DM well-controlled on insulin, h/o PPH in prior pregnancy, now for IOL for FGR with elevated UA dopplers.    -Admit to L&D  -Admission labs, PELs  -Monitor vitals  -Cont EFM/[place]  -Pain management prn  -Clear liquid diet  -GBS ppx  -FS q4h in latent labor, q2h in active labor    Dr. Gipson aware

## 2023-09-05 NOTE — OB PROVIDER H&P - ATTENDING COMMENTS
34yo  @ 36+0  with cHTN, T2DM well-controlled on insulin for induction of labor with growth restriction and history of PPH in previously delivery.

## 2023-09-05 NOTE — OB PROVIDER H&P - HISTORY OF PRESENT ILLNESS
34yo  @36w0d, EDUARDO: 10/3/23 by LMP 22, presents for IOL for FGR with newly elevated UA dopplers. Patient was seen for routine sonogram, AC 8% with newly elevated UA dopplers. Denies contractions, vaginal bleeding, leakage of fluid. Good fetal movement. Pregnancy complicated by:    #T2DM  -currently on NPH , Lispro: 10/x/12  -FS well-controlled     #cHTN  -not on any medications at this time  -BPs at home 130s-140s/80s-90s  -h/o preeclampsia with severe features in last pregnancy, s/p Mg treatment and went home on Procardia x2 months    #h/o PPH  -patient reports significant PPH in last pregnancy, required 5u pRBCs  -per patient "balloon needed to be placed" 34yo  @36w0d, EDUARDO: 10/3/23 by LMP 22, presents for IOL for FGR with newly elevated UA dopplers. Patient was seen for routine sonogram, AC 8% with newly elevated UA dopplers. Denies contractions, vaginal bleeding, leakage of fluid. Good fetal movement. Denies headache, blurry vision, chest pain, SOB, nausea, vomiting, RUQ/epigastric pain, new onset swelling. Pregnancy complicated by:    #T2DM  -currently on NPH , Lispro: 10/x/12  -FS well-controlled     #cHTN  -not on any medications at this time  -BPs at home 130s-140s/80s-90s  -h/o preeclampsia with severe features in last pregnancy, s/p Mg treatment and went home on Procardia x2 months    #h/o PPH  -patient reports significant PPH in last pregnancy, required 5u pRBCs  -per patient "balloon needed to be placed" 34yo  @36w0d, EDUARDO: 10/3/23 by LMP 22, presents for IOL for FGR with newly elevated UA dopplers. Patient was seen for routine sonogram, AC 8% with newly elevated UA dopplers. Denies contractions, vaginal bleeding, leakage of fluid. Good fetal movement. Denies headache, blurry vision, chest pain, SOB, nausea, vomiting, RUQ/epigastric pain, new onset swelling.     Pregnancy complicated by:    #T2DM  -currently on NPH , Lispro: 10/x/12  -FS well-controlled     #cHTN  -not on any medications at this time  -BPs at home 130s-140s/80s-90s  -h/o preeclampsia with severe features in last pregnancy, s/p Mg treatment and went home on Procardia x2 months    #h/o PPH  -patient reports significant PPH in last pregnancy, required 5u pRBCs  -per patient "balloon needed to be placed"

## 2023-09-05 NOTE — OB RN PATIENT PROFILE - NS PRO ABUSE SCREEN SUSPICION NEGLECT YN
unable to assess Odomzo Counseling- I discussed with the patient the risks of Odomzo including but not limited to nausea, vomiting, diarrhea, constipation, weight loss, changes in the sense of taste, decreased appetite, muscle spasms, and hair loss.  The patient verbalized understanding of the proper use and possible adverse effects of Odomzo.  All of the patient's questions and concerns were addressed.

## 2023-09-05 NOTE — OB PROVIDER H&P - NSHPPHYSICALEXAM_GEN_ALL_CORE
Vital Signs Last 24 Hrs  T(C): 36.8 (05 Sep 2023 19:05), Max: 36.8 (05 Sep 2023 19:05)  T(F): 98.24 (05 Sep 2023 19:05), Max: 98.24 (05 Sep 2023 19:05)  HR: 78 (05 Sep 2023 19:42) (74 - 90)  BP: 129/78 (05 Sep 2023 19:42) (120/72 - 146/86)  RR: 16 (05 Sep 2023 18:53) (16 - 16)    Gen: AOx3, NAD  Cardiac: RRR, S1S2, no m/r/g  Pulm: CTA b/l  Abd: soft, gravid, nontender, no palpable ctx  EFM: 130/mod/+accels  [place]: q11m  SVE: 3/50/-2, vertex, intact  BSS: vertex

## 2023-09-05 NOTE — OB PROVIDER H&P - NS_OBGYNHISTORY_OBGYN_ALL_OB_FT
OBHx: FT  x1, 10lbs --> PPH requiring 5u pRBCs, ? Bakri balloon    GynHx: denies h/o ovarian cysts, fibroids, abnormal pap smears, STIs

## 2023-09-06 ENCOUNTER — RESULT REVIEW (OUTPATIENT)
Age: 33
End: 2023-09-06

## 2023-09-06 DIAGNOSIS — O24.113 PRE-EXISTING TYPE 2 DIABETES MELLITUS, IN PREGNANCY, THIRD TRIMESTER: ICD-10-CM

## 2023-09-06 DIAGNOSIS — O09.299 SUPERVISION OF PREGNANCY WITH OTHER POOR REPRODUCTIVE OR OBSTETRIC HISTORY, UNSPECIFIED TRIMESTER: ICD-10-CM

## 2023-09-06 DIAGNOSIS — Z3A.36 36 WEEKS GESTATION OF PREGNANCY: ICD-10-CM

## 2023-09-06 DIAGNOSIS — O36.5930 MATERNAL CARE FOR OTHER KNOWN OR SUSPECTED POOR FETAL GROWTH, THIRD TRIMESTER, NOT APPLICABLE OR UNSPECIFIED: ICD-10-CM

## 2023-09-06 DIAGNOSIS — O10.013 PRE-EXISTING ESSENTIAL HYPERTENSION COMPLICATING PREGNANCY, THIRD TRIMESTER: ICD-10-CM

## 2023-09-06 DIAGNOSIS — O36.5130 MATERNAL CARE FOR KNOWN OR SUSPECTED PLACENTAL INSUFFICIENCY, THIRD TRIMESTER, NOT APPLICABLE OR UNSPECIFIED: ICD-10-CM

## 2023-09-06 LAB
BASOPHILS # BLD AUTO: 0.03 K/UL — SIGNIFICANT CHANGE UP (ref 0–0.2)
BASOPHILS NFR BLD AUTO: 0.4 % — SIGNIFICANT CHANGE UP (ref 0–1)
EOSINOPHIL # BLD AUTO: 0.04 K/UL — SIGNIFICANT CHANGE UP (ref 0–0.7)
EOSINOPHIL NFR BLD AUTO: 0.5 % — SIGNIFICANT CHANGE UP (ref 0–8)
GLUCOSE BLDC GLUCOMTR-MCNC: 110 MG/DL — HIGH (ref 70–99)
GLUCOSE BLDC GLUCOMTR-MCNC: 80 MG/DL — SIGNIFICANT CHANGE UP (ref 70–99)
GLUCOSE BLDC GLUCOMTR-MCNC: 90 MG/DL — SIGNIFICANT CHANGE UP (ref 70–99)
GLUCOSE BLDC GLUCOMTR-MCNC: 92 MG/DL — SIGNIFICANT CHANGE UP (ref 70–99)
GLUCOSE BLDC GLUCOMTR-MCNC: 96 MG/DL — SIGNIFICANT CHANGE UP (ref 70–99)
HCT VFR BLD CALC: 31.4 % — LOW (ref 37–47)
HGB BLD-MCNC: 10.2 G/DL — LOW (ref 12–16)
IMM GRANULOCYTES NFR BLD AUTO: 0.5 % — HIGH (ref 0.1–0.3)
LYMPHOCYTES # BLD AUTO: 1.71 K/UL — SIGNIFICANT CHANGE UP (ref 1.2–3.4)
LYMPHOCYTES # BLD AUTO: 22.6 % — SIGNIFICANT CHANGE UP (ref 20.5–51.1)
MCHC RBC-ENTMCNC: 24.7 PG — LOW (ref 27–31)
MCHC RBC-ENTMCNC: 32.5 G/DL — SIGNIFICANT CHANGE UP (ref 32–37)
MCV RBC AUTO: 76 FL — LOW (ref 81–99)
MONOCYTES # BLD AUTO: 0.8 K/UL — HIGH (ref 0.1–0.6)
MONOCYTES NFR BLD AUTO: 10.6 % — HIGH (ref 1.7–9.3)
NEUTROPHILS # BLD AUTO: 4.94 K/UL — SIGNIFICANT CHANGE UP (ref 1.4–6.5)
NEUTROPHILS NFR BLD AUTO: 65.4 % — SIGNIFICANT CHANGE UP (ref 42.2–75.2)
NRBC # BLD: 0 /100 WBCS — SIGNIFICANT CHANGE UP (ref 0–0)
PLATELET # BLD AUTO: 152 K/UL — SIGNIFICANT CHANGE UP (ref 130–400)
PMV BLD: 12.1 FL — HIGH (ref 7.4–10.4)
RBC # BLD: 4.13 M/UL — LOW (ref 4.2–5.4)
RBC # FLD: 15.4 % — HIGH (ref 11.5–14.5)
WBC # BLD: 7.56 K/UL — SIGNIFICANT CHANGE UP (ref 4.8–10.8)
WBC # FLD AUTO: 7.56 K/UL — SIGNIFICANT CHANGE UP (ref 4.8–10.8)

## 2023-09-06 PROCEDURE — 88307 TISSUE EXAM BY PATHOLOGIST: CPT | Mod: 26

## 2023-09-06 PROCEDURE — 59409 OBSTETRICAL CARE: CPT | Mod: U9

## 2023-09-06 RX ORDER — DIPHENHYDRAMINE HCL 50 MG
25 CAPSULE ORAL EVERY 6 HOURS
Refills: 0 | Status: DISCONTINUED | OUTPATIENT
Start: 2023-09-06 | End: 2023-09-07

## 2023-09-06 RX ORDER — AER TRAVELER 0.5 G/1
1 SOLUTION RECTAL; TOPICAL EVERY 4 HOURS
Refills: 0 | Status: DISCONTINUED | OUTPATIENT
Start: 2023-09-06 | End: 2023-09-07

## 2023-09-06 RX ORDER — HUMAN INSULIN 100 [IU]/ML
17 INJECTION, SUSPENSION SUBCUTANEOUS AT BEDTIME
Refills: 0 | Status: DISCONTINUED | OUTPATIENT
Start: 2023-09-06 | End: 2023-09-07

## 2023-09-06 RX ORDER — DEXTROSE 50 % IN WATER 50 %
25 SYRINGE (ML) INTRAVENOUS ONCE
Refills: 0 | Status: DISCONTINUED | OUTPATIENT
Start: 2023-09-06 | End: 2023-09-07

## 2023-09-06 RX ORDER — CARBOPROST TROMETHAMINE 250 UG/ML
250 INJECTION, SOLUTION INTRAMUSCULAR ONCE
Refills: 0 | Status: DISCONTINUED | OUTPATIENT
Start: 2023-09-06 | End: 2023-09-07

## 2023-09-06 RX ORDER — SODIUM CHLORIDE 9 MG/ML
1000 INJECTION, SOLUTION INTRAVENOUS
Refills: 0 | Status: DISCONTINUED | OUTPATIENT
Start: 2023-09-06 | End: 2023-09-07

## 2023-09-06 RX ORDER — TETANUS TOXOID, REDUCED DIPHTHERIA TOXOID AND ACELLULAR PERTUSSIS VACCINE, ADSORBED 5; 2.5; 8; 8; 2.5 [IU]/.5ML; [IU]/.5ML; UG/.5ML; UG/.5ML; UG/.5ML
0.5 SUSPENSION INTRAMUSCULAR ONCE
Refills: 0 | Status: DISCONTINUED | OUTPATIENT
Start: 2023-09-06 | End: 2023-09-07

## 2023-09-06 RX ORDER — LANOLIN
1 OINTMENT (GRAM) TOPICAL EVERY 6 HOURS
Refills: 0 | Status: DISCONTINUED | OUTPATIENT
Start: 2023-09-06 | End: 2023-09-07

## 2023-09-06 RX ORDER — DEXTROSE 50 % IN WATER 50 %
15 SYRINGE (ML) INTRAVENOUS ONCE
Refills: 0 | Status: DISCONTINUED | OUTPATIENT
Start: 2023-09-06 | End: 2023-09-07

## 2023-09-06 RX ORDER — INSULIN LISPRO 100/ML
VIAL (ML) SUBCUTANEOUS
Refills: 0 | Status: DISCONTINUED | OUTPATIENT
Start: 2023-09-06 | End: 2023-09-07

## 2023-09-06 RX ORDER — SIMETHICONE 80 MG/1
80 TABLET, CHEWABLE ORAL EVERY 4 HOURS
Refills: 0 | Status: DISCONTINUED | OUTPATIENT
Start: 2023-09-06 | End: 2023-09-07

## 2023-09-06 RX ORDER — HUMAN INSULIN 100 [IU]/ML
10 INJECTION, SUSPENSION SUBCUTANEOUS
Refills: 0 | Status: DISCONTINUED | OUTPATIENT
Start: 2023-09-06 | End: 2023-09-07

## 2023-09-06 RX ORDER — DEXTROSE 50 % IN WATER 50 %
12.5 SYRINGE (ML) INTRAVENOUS ONCE
Refills: 0 | Status: DISCONTINUED | OUTPATIENT
Start: 2023-09-06 | End: 2023-09-07

## 2023-09-06 RX ORDER — IBUPROFEN 200 MG
600 TABLET ORAL EVERY 6 HOURS
Refills: 0 | Status: COMPLETED | OUTPATIENT
Start: 2023-09-06 | End: 2024-08-04

## 2023-09-06 RX ORDER — INSULIN LISPRO 100/ML
5 VIAL (ML) SUBCUTANEOUS
Refills: 0 | Status: DISCONTINUED | OUTPATIENT
Start: 2023-09-06 | End: 2023-09-07

## 2023-09-06 RX ORDER — OXYCODONE HYDROCHLORIDE 5 MG/1
5 TABLET ORAL
Refills: 0 | Status: DISCONTINUED | OUTPATIENT
Start: 2023-09-06 | End: 2023-09-07

## 2023-09-06 RX ORDER — SODIUM CHLORIDE 9 MG/ML
3 INJECTION INTRAMUSCULAR; INTRAVENOUS; SUBCUTANEOUS EVERY 8 HOURS
Refills: 0 | Status: DISCONTINUED | OUTPATIENT
Start: 2023-09-06 | End: 2023-09-07

## 2023-09-06 RX ORDER — BENZOCAINE 10 %
1 GEL (GRAM) MUCOUS MEMBRANE EVERY 6 HOURS
Refills: 0 | Status: DISCONTINUED | OUTPATIENT
Start: 2023-09-06 | End: 2023-09-07

## 2023-09-06 RX ORDER — IBUPROFEN 200 MG
600 TABLET ORAL EVERY 6 HOURS
Refills: 0 | Status: DISCONTINUED | OUTPATIENT
Start: 2023-09-06 | End: 2023-09-07

## 2023-09-06 RX ORDER — MAGNESIUM HYDROXIDE 400 MG/1
30 TABLET, CHEWABLE ORAL
Refills: 0 | Status: DISCONTINUED | OUTPATIENT
Start: 2023-09-06 | End: 2023-09-07

## 2023-09-06 RX ORDER — KETOROLAC TROMETHAMINE 30 MG/ML
30 SYRINGE (ML) INJECTION ONCE
Refills: 0 | Status: DISCONTINUED | OUTPATIENT
Start: 2023-09-06 | End: 2023-09-07

## 2023-09-06 RX ORDER — OXYCODONE HYDROCHLORIDE 5 MG/1
5 TABLET ORAL ONCE
Refills: 0 | Status: DISCONTINUED | OUTPATIENT
Start: 2023-09-06 | End: 2023-09-07

## 2023-09-06 RX ORDER — ACETAMINOPHEN 500 MG
975 TABLET ORAL
Refills: 0 | Status: DISCONTINUED | OUTPATIENT
Start: 2023-09-06 | End: 2023-09-07

## 2023-09-06 RX ORDER — INSULIN LISPRO 100/ML
6 VIAL (ML) SUBCUTANEOUS
Refills: 0 | Status: DISCONTINUED | OUTPATIENT
Start: 2023-09-06 | End: 2023-09-07

## 2023-09-06 RX ORDER — DIBUCAINE 1 %
1 OINTMENT (GRAM) RECTAL EVERY 6 HOURS
Refills: 0 | Status: DISCONTINUED | OUTPATIENT
Start: 2023-09-06 | End: 2023-09-07

## 2023-09-06 RX ORDER — FENTANYL/BUPIVACAINE/NS/PF 2MCG/ML-.1
250 PLASTIC BAG, INJECTION (ML) INJECTION
Refills: 0 | Status: DISCONTINUED | OUTPATIENT
Start: 2023-09-06 | End: 2023-09-06

## 2023-09-06 RX ORDER — GLUCAGON INJECTION, SOLUTION 0.5 MG/.1ML
1 INJECTION, SOLUTION SUBCUTANEOUS ONCE
Refills: 0 | Status: DISCONTINUED | OUTPATIENT
Start: 2023-09-06 | End: 2023-09-07

## 2023-09-06 RX ADMIN — Medication 975 MILLIGRAM(S): at 21:52

## 2023-09-06 RX ADMIN — SODIUM CHLORIDE 3 MILLILITER(S): 9 INJECTION INTRAMUSCULAR; INTRAVENOUS; SUBCUTANEOUS at 18:46

## 2023-09-06 RX ADMIN — Medication 6 UNIT(S): at 17:44

## 2023-09-06 RX ADMIN — Medication 108 GRAM(S): at 03:43

## 2023-09-06 RX ADMIN — Medication 975 MILLIGRAM(S): at 22:30

## 2023-09-06 RX ADMIN — Medication 5 UNIT(S): at 09:14

## 2023-09-06 RX ADMIN — HUMAN INSULIN 10 UNIT(S): 100 INJECTION, SUSPENSION SUBCUTANEOUS at 09:13

## 2023-09-06 RX ADMIN — SODIUM CHLORIDE 3 MILLILITER(S): 9 INJECTION INTRAMUSCULAR; INTRAVENOUS; SUBCUTANEOUS at 21:46

## 2023-09-06 RX ADMIN — Medication 1 SPRAY(S): at 07:00

## 2023-09-06 RX ADMIN — MAGNESIUM HYDROXIDE 30 MILLILITER(S): 400 TABLET, CHEWABLE ORAL at 21:59

## 2023-09-06 RX ADMIN — Medication 975 MILLIGRAM(S): at 09:15

## 2023-09-06 RX ADMIN — Medication 975 MILLIGRAM(S): at 09:45

## 2023-09-06 RX ADMIN — HUMAN INSULIN 17 UNIT(S): 100 INJECTION, SUSPENSION SUBCUTANEOUS at 21:52

## 2023-09-06 NOTE — OB RN DELIVERY SUMMARY - NSSELHIDDEN_OBGYN_ALL_OB_FT
[NS_DeliveryAttending1_OBGYN_ALL_OB_FT:[email or web address]=],[NS_DeliveryAssist1_OBGYN_ALL_OB_FT:[email or web address]=],[NS_DeliveryAssist2_OBGYN_ALL_OB_FT:[id number]=],[NS_DeliveryRN_OBGYN_ALL_OB_FT:[id number]=]

## 2023-09-06 NOTE — PROCEDURAL SAFETY CHECKLIST WITH OR WITHOUT SEDATION - FIRE RISK ASSESSMENT ADDRESSED
Call to patient per elias Sinclair to take omeprazole. Patient aware and has some left over. He will call if he needs more. Patient states he picked up his medications, started them and noticed that the swelling is coming down. No further questions at this time.  Kami Simmons LPN  Nephrology  [phone]     n/a

## 2023-09-06 NOTE — PROGRESS NOTE ADULT - SUBJECTIVE AND OBJECTIVE BOX
PGY3 Note    Patient seen at bedside for evaluation of labor progression, doing well, no complaints.     T(F): 98.06 (09-05-23 @ 22:38), Max: 98.24 (09-05-23 @ 19:05)  HR: 45 (09-06-23 @ 01:57) (41 - 90)  BP: 124/74 (09-06-23 @ 01:57) (111/58 - 146/86)  RR: 16 (09-05-23 @ 18:53) (16 - 16)    EFM: 130/mod/pos acc  TOCO: 3 mins  SVE: 4/70/-2, vtx, AROM clear    Medications:  (ADM OVERRIDE): 1 Each (09-05-23 @ 22:01)  (ADM OVERRIDE): 1 Each (09-05-23 @ 23:43)  (ADM OVERRIDE): 1 Each (09-05-23 @ 19:33)  (ADM OVERRIDE): 1 Each (09-05-23 @ 22:01)  ampicillin  IVPB: 108 mL/Hr (09-05-23 @ 23:46)  ampicillin  IVPB: 200 mL/Hr (09-05-23 @ 19:40)  oxytocin Infusion.: 2 mL/Hr (09-05-23 @ 21:44)      Labs:                        12.2   5.94  )-----------( 215      ( 05 Sep 2023 19:10 )             37.8     09-05    135  |  105  |  11  ----------------------------<  85  4.3   |  17  |  0.6<L>    Ca    9.1      05 Sep 2023 19:10    TPro  6.4  /  Alb  3.5  /  TBili  <0.2  /  DBili  x   /  AST  21  /  ALT  9   /  AlkPhos  205<H>  09-05    ABO RH Interpretation: O POS (09-05-23 @ 20:45)    Antibody Screen: NEG (09-05-23 @ 20:45)    Urinalysis Basic - ( 05 Sep 2023 19:10 )    Color: x / Appearance: x / SG: x / pH: x  Gluc: 85 mg/dL / Ketone: x  / Bili: x / Urobili: x   Blood: x / Protein: x / Nitrite: x   Leuk Esterase: x / RBC: x / WBC x   Sq Epi: x / Non Sq Epi: x / Bacteria: x        Prenatal Syphilis Test: Nonreact (09-05-23 @ 19:10)    Uric Acid: 5.1 mg/dL (09-05-23 @ 19:10)    Lactate Dehydrogenase, Serum: 256 (09-05-23 @ 19:10)

## 2023-09-06 NOTE — OB PROVIDER DELIVERY SUMMARY - NSSELHIDDEN_OBGYN_ALL_OB_FT
[NS_DeliveryAttending1_OBGYN_ALL_OB_FT:[email or web address]=],[NS_DeliveryAssist1_OBGYN_ALL_OB_FT:[id number]=],[NS_DeliveryAssist2_OBGYN_ALL_OB_FT:[email or web address]=]

## 2023-09-06 NOTE — OB PROVIDER DELIVERY SUMMARY - NSPROVIDERDELIVERYNOTE_OBGYN_ALL_OB_FT
Patient was fully dilated and pushing. Fetal head was OA and restituted to LOT. The anterior and posterior shoulders delivered, followed by the remaining body atraumatically. Delayed cord clamping was performed, and then clamped and cut. Cord blood gases collected x2. The  was handed to the mother and RN. The placenta delivered intact with membranes. Pitocin was administered, 1000mg cytotec was given rectally, and IM Hemabate x1. Uterus massaged, fundus found to be firm. Cervix, vagina and perineum inspected, 1st degree laceration was noted, repaired using 3.0 chromic gut in the usual fashion with good hemostasis.     Viable female infant delivered, with APGARs 9/9    Laceration: 1st degree   EBL 400cc    Dr. Gipson and Dr. Holguin present for the delivery

## 2023-09-07 ENCOUNTER — TRANSCRIPTION ENCOUNTER (OUTPATIENT)
Age: 33
End: 2023-09-07

## 2023-09-07 VITALS
HEART RATE: 72 BPM | DIASTOLIC BLOOD PRESSURE: 74 MMHG | TEMPERATURE: 98 F | RESPIRATION RATE: 18 BRPM | SYSTOLIC BLOOD PRESSURE: 122 MMHG

## 2023-09-07 LAB
GLUCOSE BLDC GLUCOMTR-MCNC: 108 MG/DL — HIGH (ref 70–99)
GLUCOSE BLDC GLUCOMTR-MCNC: 111 MG/DL — HIGH (ref 70–99)
GLUCOSE BLDC GLUCOMTR-MCNC: 84 MG/DL — SIGNIFICANT CHANGE UP (ref 70–99)

## 2023-09-07 PROCEDURE — 99238 HOSP IP/OBS DSCHRG MGMT 30/<: CPT

## 2023-09-07 RX ORDER — INSULIN LISPRO 100/ML
6 VIAL (ML) SUBCUTANEOUS
Qty: 0 | Refills: 0 | DISCHARGE
Start: 2023-09-07

## 2023-09-07 RX ORDER — INSULIN LISPRO 100/ML
5 VIAL (ML) SUBCUTANEOUS
Qty: 0 | Refills: 0 | DISCHARGE
Start: 2023-09-07

## 2023-09-07 RX ORDER — HUMAN INSULIN 100 [IU]/ML
10 INJECTION, SUSPENSION SUBCUTANEOUS
Qty: 0 | Refills: 0 | DISCHARGE
Start: 2023-09-07

## 2023-09-07 RX ORDER — HUMAN INSULIN 100 [IU]/ML
17 INJECTION, SUSPENSION SUBCUTANEOUS
Qty: 0 | Refills: 0 | DISCHARGE
Start: 2023-09-07

## 2023-09-07 RX ORDER — ACETAMINOPHEN 500 MG
3 TABLET ORAL
Qty: 0 | Refills: 0 | DISCHARGE
Start: 2023-09-07

## 2023-09-07 RX ORDER — IBUPROFEN 200 MG
1 TABLET ORAL
Qty: 0 | Refills: 0 | DISCHARGE
Start: 2023-09-07

## 2023-09-07 RX ADMIN — Medication 650 MILLIGRAM(S): at 09:53

## 2023-09-07 RX ADMIN — Medication 600 MILLIGRAM(S): at 13:10

## 2023-09-07 RX ADMIN — Medication 600 MILLIGRAM(S): at 01:01

## 2023-09-07 RX ADMIN — SODIUM CHLORIDE 3 MILLILITER(S): 9 INJECTION INTRAMUSCULAR; INTRAVENOUS; SUBCUTANEOUS at 14:27

## 2023-09-07 RX ADMIN — Medication 600 MILLIGRAM(S): at 06:17

## 2023-09-07 RX ADMIN — HUMAN INSULIN 10 UNIT(S): 100 INJECTION, SUSPENSION SUBCUTANEOUS at 08:36

## 2023-09-07 RX ADMIN — Medication 600 MILLIGRAM(S): at 12:39

## 2023-09-07 RX ADMIN — Medication 600 MILLIGRAM(S): at 06:09

## 2023-09-07 RX ADMIN — Medication 600 MILLIGRAM(S): at 00:07

## 2023-09-07 RX ADMIN — SODIUM CHLORIDE 3 MILLILITER(S): 9 INJECTION INTRAMUSCULAR; INTRAVENOUS; SUBCUTANEOUS at 06:07

## 2023-09-07 RX ADMIN — Medication 5 UNIT(S): at 08:35

## 2023-09-07 RX ADMIN — Medication 600 MILLIGRAM(S): at 18:30

## 2023-09-07 RX ADMIN — Medication 600 MILLIGRAM(S): at 17:58

## 2023-09-07 RX ADMIN — Medication 975 MILLIGRAM(S): at 10:30

## 2023-09-07 NOTE — DISCHARGE NOTE OB - CARE PLAN
1 Principal Discharge DX:	Vaginal delivery  Assessment and plan of treatment:	No heavy lifting.   Nothing inside the vagina for 6 weeks: no tampons, douching, sexual intercourse, tub baths or pools.   If you have a fever over 100.4F, severe abdominal pain or heavy vaginal bleeding please call your doctor or go to the emergency room.    Please follow up with your OBGYN in 6 weeks for a postpartum visit.  Secondary Diagnosis:	Chronic hypertension  Assessment and plan of treatment:	If you have headache, blurry vision, chest pain, difficulty breathing, or severe abdominal pain, call the doctor or return to the hospital.  Secondary Diagnosis:	Type 2 diabetes mellitus  Assessment and plan of treatment:	Adjust insulin regimen to:  insulin isophane (NPH) 100 units/mL   -- 10 unit(s) once a day in the morning  -- 17 unit(s) once a day at bedtime    insulin lispro 100 units/mL injectable solution  -- 5 unit(s) once a day before breakfast  -- 6 unit(s) injectable once a day with dinner

## 2023-09-07 NOTE — DISCHARGE NOTE OB - CARE PROVIDERS DIRECT ADDRESSES
[email or web address]

## 2023-09-07 NOTE — PROGRESS NOTE ADULT - SUBJECTIVE AND OBJECTIVE BOX
PGY 1 Note:    Pt seen and evaluated at bedside. Pain well controlled. Vaginal bleeding minimal. Denies headaches, CP, SOB, palpitations or RUQ/epigastric pain. Denies fever, chills, dizziness/lightheadedness, nausea/vomiting, dysuria, or LE pain. Declines contraception at this time  Ambulating: Yes  Voiding: Yes  Breast or bottle feeding: Breast  Diet: tolerating PO    Physical Exam  Vital Signs Last 24 Hrs  T(C): 36.5 (07 Sep 2023 03:22), Max: 37 (06 Sep 2023 23:29)  T(F): 97.7 (07 Sep 2023 03:22), Max: 98.6 (06 Sep 2023 23:29)  HR: 71 (07 Sep 2023 03:22) (60 - 80)  BP: 136/74 (07 Sep 2023 03:22) (106/62 - 148/75)  RR: 18 (07 Sep 2023 03:22) (16 - 18)      Gen: AAOx3, NAD  Fundus: firm, below umbilicus   Abd: Soft, nontender, nondistended  Lochia: minimal   Ext: No calf tenderness, no swelling    Labs:                        10.2   7.56  )-----------( 152      ( 06 Sep 2023 17:51 )             31.4                         12.2   5.94  )-----------( 215      ( 05 Sep 2023 19:10 )             37.8        MEDICATIONS  (STANDING):  acetaminophen     Tablet .. 975 milliGRAM(s) Oral <User Schedule>  carboprost Injectable 250 MICROGram(s) IntraMuscular once  dextrose 5%. 1000 milliLiter(s) (50 mL/Hr) IV Continuous <Continuous>  dextrose 5%. 1000 milliLiter(s) (100 mL/Hr) IV Continuous <Continuous>  dextrose 50% Injectable 25 Gram(s) IV Push once  dextrose 50% Injectable 25 Gram(s) IV Push once  dextrose 50% Injectable 12.5 Gram(s) IV Push once  diphtheria/tetanus/pertussis (acellular) Vaccine (Adacel) 0.5 milliLiter(s) IntraMuscular once  glucagon  Injectable 1 milliGRAM(s) IntraMuscular once  ibuprofen  Tablet. 600 milliGRAM(s) Oral every 6 hours  insulin lispro (ADMELOG) corrective regimen sliding scale   SubCutaneous three times a day before meals  insulin lispro Injectable (ADMELOG) 6 Unit(s) SubCutaneous before dinner  insulin lispro Injectable (ADMELOG) 5 Unit(s) SubCutaneous before breakfast  insulin NPH human recombinant 17 Unit(s) SubCutaneous at bedtime  insulin NPH human recombinant 10 Unit(s) SubCutaneous before breakfast  ketorolac   Injectable 30 milliGRAM(s) IV Push once  misoprostol 1000 MICROGram(s) Rectal once  oxytocin Infusion 333.333 milliUNIT(s)/Min (1000 mL/Hr) IV Continuous <Continuous>  oxytocin Infusion. 2 milliUNIT(s)/Min (2 mL/Hr) IV Continuous <Continuous>  prenatal multivitamin 1 Tablet(s) Oral daily  sodium chloride 0.9% lock flush 3 milliLiter(s) IV Push every 8 hours    MEDICATIONS  (PRN):  benzocaine 20%/menthol 0.5% Spray 1 Spray(s) Topical every 6 hours PRN for Perineal discomfort  dextrose Oral Gel 15 Gram(s) Oral once PRN Blood Glucose LESS THAN 70 milliGRAM(s)/deciliter  dibucaine 1% Ointment 1 Application(s) Topical every 6 hours PRN Perineal discomfort  diphenhydrAMINE 25 milliGRAM(s) Oral every 6 hours PRN Pruritus  lanolin Ointment 1 Application(s) Topical every 6 hours PRN nipple soreness  magnesium hydroxide Suspension 30 milliLiter(s) Oral two times a day PRN Constipation  oxyCODONE    IR 5 milliGRAM(s) Oral once PRN Moderate to Severe Pain (4-10)  oxyCODONE    IR 5 milliGRAM(s) Oral every 3 hours PRN Moderate to Severe Pain (4-10)  simethicone 80 milliGRAM(s) Chew every 4 hours PRN Gas  witch hazel Pads 1 Application(s) Topical every 4 hours PRN Perineal discomfort

## 2023-09-07 NOTE — DISCHARGE NOTE OB - PATIENT PORTAL LINK FT
You can access the FollowMyHealth Patient Portal offered by [facility] by registering at the following website: [email or web address]. By joining [email or web address] FollowMyHealth portal, you will also be able to view your health information using other applications (apps) compatible with our system.

## 2023-09-07 NOTE — PROGRESS NOTE ADULT - ASSESSMENT
A/P :32yo now P2, s/p  s/p cytotec x1, hemabate x1, EBL 400cc, PPD 1, cHTN, T2DM, recovering well   - pain management with PO pain meds   - monitor vitals/bleeding   - encourage PO hydration, ambulation  - regular diet as tolerated   - routine postpartum care   - T2DM: [facility]PH 10/17 and [facility]  - anticipate d/c home today    Dr. Gutierrez and Dr. Bartholomew to be made aware
34yo  @36w1d, GBS unknown, cHTN, T2DM well-controlled on insulin, h/o PPH in prior pregnancy, on pitocin for IOL for FGR with elevated UA dopplers.    -Continue pitocin, currently at 14U  -Monitor vitals  -Cont EFM/Natalbany  -Pain management prn  -Clear liquid diet  -GBS ppx with ampicillin   -FS q4h in latent labor, q2h in active labor  -crossed 2U with PRBCs, 2 PIVs     Dr. Gipson to be made aware

## 2023-09-07 NOTE — DISCHARGE NOTE OB - HOSPITAL COURSE
DATE OF DISCHARGE: 23 @ 06:37    HISTORY OF PRESENT ILLNESS/HOSPITAL COURSE: HPI:  32yo  @36w0d, EDUARDO: 10/3/23 by LMP 22, presents for IOL for FGR with newly elevated UA dopplers. Patient was seen for routine sonogram, AC 8% with newly elevated UA dopplers. Denies contractions, vaginal bleeding, leakage of fluid. Good fetal movement. Denies headache, blurry vision, chest pain, SOB, nausea, vomiting, RUQ/epigastric pain, new onset swelling.     Pregnancy complicated by:    #T2DM  -currently on NPH , Lispro: 10/x/12  -FS well-controlled     #cHTN  -not on any medications at this time  -BPs at home 130s-140s/80s-90s  -h/o preeclampsia with severe features in last pregnancy, s/p Mg treatment and went home on Procardia x2 months    #h/o PPH  -patient reports significant PPH in last pregnancy, required 5u pRBCs  -per patient "balloon needed to be placed" (05 Sep 2023 19:38)    PAST MEDICAL & SURGICAL HISTORY:  Type 2 diabetes mellitus      Chronic hypertension      No significant past surgical history          PROCEDURES PERFORMED: Term spontaneous vaginal delivery, EBL 400cc  POST PARTUM COURSE: uncomplicated, discharged home on PPD2.  adjusted NPH 10/17 and lipro )     DISCHARGE CBC:                       10.2   7.56  )-----------( 152      ( 06 Sep 2023 17:51 )             31.4     DISCHARGE INSTRUCTIONS:  If you have severe abdominal pain, heavy vaginal bleeding, shortness of breath or chest pain please call your doctor or come to the emergency room.   DIET:  Advance as tolerated.  ACTIVITY:  Advance as tolerated.  Pelvic rest for 6 weeks.  Nothing to be inserted into the vagina for 6 weeks, i.e. no tampons, douching, sexual relations, or tub baths.   FOLLOW UP: Make an appointment to see your doctor as instructed   PRESCRIPTIONS: Prescriptions:

## 2023-09-07 NOTE — DISCHARGE NOTE OB - NS MD DC FALL RISK RISK
For information on Fall & Injury Prevention, visit: [email or web address] OR  [email or web address] OR  https://www.cdc.gov/steadi/patient.html

## 2023-09-07 NOTE — DISCHARGE NOTE OB - PLAN OF CARE
No heavy lifting.   Nothing inside the vagina for 6 weeks: no tampons, douching, sexual intercourse, tub baths or pools.   If you have a fever over 100.4F, severe abdominal pain or heavy vaginal bleeding please call your doctor or go to the emergency room.    Please follow up with your OBGYN in 6 weeks for a postpartum visit. Adjust insulin regimen to:  insulin isophane (NPH) 100 units/mL   -- 10 unit(s) once a day in the morning  -- 17 unit(s) once a day at bedtime    insulin lispro 100 units/mL injectable solution  -- 5 unit(s) once a day before breakfast  -- 6 unit(s) injectable once a day with dinner If you have headache, blurry vision, chest pain, difficulty breathing, or severe abdominal pain, call the doctor or return to the hospital.

## 2023-09-07 NOTE — DISCHARGE NOTE OB - CARE PROVIDER_API CALL
Rafael Bartholomew  Obstetrics and Gynecology  [address] [place] [id number]  Phone: [phone]  Fax: [phone]  Follow Up Time:

## 2023-09-07 NOTE — DISCHARGE NOTE OB - MEDICATION SUMMARY - MEDICATIONS TO TAKE
I will START or STAY ON the medications listed below when I get home from the hospital:    ibuprofen 600 mg oral tablet  -- 1 tab(s) by mouth every 6 hours  -- Indication: For Pain    acetaminophen 325 mg oral tablet  -- 3 tab(s) by mouth every 6 hours  -- Indication: For Pain    insulin isophane (NPH) 100 units/mL human recombinant subcutaneous suspension  -- 10 unit(s) subcutaneous once a day (before a meal)  -- Indication: For diabetes    insulin lispro 100 units/mL injectable solution  -- 5 unit(s) injectable once a day before breakfast  -- Indication: For diabetes    insulin isophane (NPH) 100 units/mL human recombinant subcutaneous suspension  -- 17 unit(s) subcutaneous once a day (at bedtime)  -- Indication: For diabetes    insulin lispro 100 units/mL injectable solution  -- 6 unit(s) injectable once a day with dinner  -- Indication: For diabetes

## 2023-09-08 ENCOUNTER — APPOINTMENT (OUTPATIENT)
Dept: ANTEPARTUM | Facility: CLINIC | Age: 33
End: 2023-09-08

## 2023-09-08 LAB
SURGICAL PATHOLOGY STUDY: SIGNIFICANT CHANGE UP

## 2023-09-10 ENCOUNTER — OUTPATIENT (OUTPATIENT)
Dept: INPATIENT UNIT | Facility: HOSPITAL | Age: 33
LOS: 1 days | Discharge: HOME | End: 2023-09-10
Payer: MEDICAID

## 2023-09-10 VITALS
TEMPERATURE: 98 F | HEART RATE: 98 BPM | SYSTOLIC BLOOD PRESSURE: 131 MMHG | RESPIRATION RATE: 18 BRPM | DIASTOLIC BLOOD PRESSURE: 93 MMHG

## 2023-09-10 VITALS — HEART RATE: 77 BPM | DIASTOLIC BLOOD PRESSURE: 87 MMHG | SYSTOLIC BLOOD PRESSURE: 134 MMHG

## 2023-09-10 DIAGNOSIS — O26.899 OTHER SPECIFIED PREGNANCY RELATED CONDITIONS, UNSPECIFIED TRIMESTER: ICD-10-CM

## 2023-09-10 DIAGNOSIS — Z3A.00 WEEKS OF GESTATION OF PREGNANCY NOT SPECIFIED: ICD-10-CM

## 2023-09-10 LAB
ALBUMIN SERPL ELPH-MCNC: 4.2 G/DL — SIGNIFICANT CHANGE UP (ref 3.5–5.2)
ALP SERPL-CCNC: 167 U/L — HIGH (ref 30–115)
ALT FLD-CCNC: 29 U/L — SIGNIFICANT CHANGE UP (ref 0–41)
ANION GAP SERPL CALC-SCNC: 13 MMOL/L — SIGNIFICANT CHANGE UP (ref 7–14)
APTT BLD: 30.4 SEC — SIGNIFICANT CHANGE UP (ref 27–39.2)
AST SERPL-CCNC: 20 U/L — SIGNIFICANT CHANGE UP (ref 0–41)
BASOPHILS # BLD AUTO: 0.03 K/UL — SIGNIFICANT CHANGE UP (ref 0–0.2)
BASOPHILS NFR BLD AUTO: 0.4 % — SIGNIFICANT CHANGE UP (ref 0–1)
BILIRUB SERPL-MCNC: <0.2 MG/DL — SIGNIFICANT CHANGE UP (ref 0.2–1.2)
BUN SERPL-MCNC: 9 MG/DL — LOW (ref 10–20)
CALCIUM SERPL-MCNC: 10.3 MG/DL — SIGNIFICANT CHANGE UP (ref 8.4–10.5)
CHLORIDE SERPL-SCNC: 102 MMOL/L — SIGNIFICANT CHANGE UP (ref 98–110)
CO2 SERPL-SCNC: 21 MMOL/L — SIGNIFICANT CHANGE UP (ref 17–32)
CREAT SERPL-MCNC: 0.6 MG/DL — LOW (ref 0.7–1.5)
EGFR: 123 ML/MIN/1.73M2 — SIGNIFICANT CHANGE UP
EOSINOPHIL # BLD AUTO: 0.14 K/UL — SIGNIFICANT CHANGE UP (ref 0–0.7)
EOSINOPHIL NFR BLD AUTO: 2 % — SIGNIFICANT CHANGE UP (ref 0–8)
FIBRINOGEN PPP-MCNC: 660 MG/DL — HIGH (ref 204.4–570.6)
GLUCOSE SERPL-MCNC: 86 MG/DL — SIGNIFICANT CHANGE UP (ref 70–99)
HCT VFR BLD CALC: 39.5 % — SIGNIFICANT CHANGE UP (ref 37–47)
HGB BLD-MCNC: 12.3 G/DL — SIGNIFICANT CHANGE UP (ref 12–16)
IMM GRANULOCYTES NFR BLD AUTO: 1.7 % — HIGH (ref 0.1–0.3)
INR BLD: 0.86 RATIO — SIGNIFICANT CHANGE UP (ref 0.65–1.3)
LDH SERPL L TO P-CCNC: 211 — SIGNIFICANT CHANGE UP (ref 50–242)
LYMPHOCYTES # BLD AUTO: 1.65 K/UL — SIGNIFICANT CHANGE UP (ref 1.2–3.4)
LYMPHOCYTES # BLD AUTO: 23.6 % — SIGNIFICANT CHANGE UP (ref 20.5–51.1)
MCHC RBC-ENTMCNC: 24.1 PG — LOW (ref 27–31)
MCHC RBC-ENTMCNC: 31.1 G/DL — LOW (ref 32–37)
MCV RBC AUTO: 77.5 FL — LOW (ref 81–99)
MONOCYTES # BLD AUTO: 0.49 K/UL — SIGNIFICANT CHANGE UP (ref 0.1–0.6)
MONOCYTES NFR BLD AUTO: 7 % — SIGNIFICANT CHANGE UP (ref 1.7–9.3)
NEUTROPHILS # BLD AUTO: 4.56 K/UL — SIGNIFICANT CHANGE UP (ref 1.4–6.5)
NEUTROPHILS NFR BLD AUTO: 65.3 % — SIGNIFICANT CHANGE UP (ref 42.2–75.2)
NRBC # BLD: 0 /100 WBCS — SIGNIFICANT CHANGE UP (ref 0–0)
PLATELET # BLD AUTO: 287 K/UL — SIGNIFICANT CHANGE UP (ref 130–400)
PMV BLD: 10.6 FL — HIGH (ref 7.4–10.4)
POTASSIUM SERPL-MCNC: 3.7 MMOL/L — SIGNIFICANT CHANGE UP (ref 3.5–5)
POTASSIUM SERPL-SCNC: 3.7 MMOL/L — SIGNIFICANT CHANGE UP (ref 3.5–5)
PROT SERPL-MCNC: 7.4 G/DL — SIGNIFICANT CHANGE UP (ref 6–8)
PROTHROM AB SERPL-ACNC: 9.7 SEC — LOW (ref 9.95–12.87)
RBC # BLD: 5.1 M/UL — SIGNIFICANT CHANGE UP (ref 4.2–5.4)
RBC # FLD: 15.6 % — HIGH (ref 11.5–14.5)
SODIUM SERPL-SCNC: 136 MMOL/L — SIGNIFICANT CHANGE UP (ref 135–146)
URATE SERPL-MCNC: 4.1 MG/DL — SIGNIFICANT CHANGE UP (ref 2.5–7)
WBC # BLD: 6.99 K/UL — SIGNIFICANT CHANGE UP (ref 4.8–10.8)
WBC # FLD AUTO: 6.99 K/UL — SIGNIFICANT CHANGE UP (ref 4.8–10.8)

## 2023-09-10 PROCEDURE — 85610 PROTHROMBIN TIME: CPT

## 2023-09-10 PROCEDURE — 99418 PROLNG IP/OBS E/M EA 15 MIN: CPT

## 2023-09-10 PROCEDURE — 99233 SBSQ HOSP IP/OBS HIGH 50: CPT

## 2023-09-10 PROCEDURE — 83615 LACTATE (LD) (LDH) ENZYME: CPT

## 2023-09-10 PROCEDURE — 85025 COMPLETE CBC W/AUTO DIFF WBC: CPT

## 2023-09-10 PROCEDURE — 80053 COMPREHEN METABOLIC PANEL: CPT

## 2023-09-10 PROCEDURE — 84156 ASSAY OF PROTEIN URINE: CPT

## 2023-09-10 PROCEDURE — 99214 OFFICE O/P EST MOD 30 MIN: CPT

## 2023-09-10 PROCEDURE — 85730 THROMBOPLASTIN TIME PARTIAL: CPT

## 2023-09-10 PROCEDURE — 82570 ASSAY OF URINE CREATININE: CPT

## 2023-09-10 PROCEDURE — 36415 COLL VENOUS BLD VENIPUNCTURE: CPT

## 2023-09-10 PROCEDURE — 84550 ASSAY OF BLOOD/URIC ACID: CPT

## 2023-09-10 PROCEDURE — 85384 FIBRINOGEN ACTIVITY: CPT

## 2023-09-10 NOTE — OB POSTPARTUM TRIAGE NOTE - FALL HARM RISK - UNIVERSAL INTERVENTIONS
Bed in lowest position, wheels locked, appropriate side rails in place/Call bell, personal items and telephone in reach/Instruct patient to call for assistance before getting out of bed or chair/Non-slip footwear when patient is out of bed/[place] to call system/Physically safe environment - no spills, clutter or unnecessary equipment/Purposeful Proactive Rounding/Room/bathroom lighting operational, light cord in reach

## 2023-09-10 NOTE — OB POSTPARTUM TRIAGE NOTE - ATTENDING COMMENTS
I was physically present for the key portions of the evaluation and management (e/m) service provided. I agree with the above history,physical and plan which I have reviewed and edited where appropriate  Patient seen face to face and case reviewed, precautions given to patient    Patient presented to triage and was evaluated starting 19:22. Evaluation ended 23:20. I spent 238 minutes caring for the patient. It included obtaining a history, performing an examination, ordering and reviewing labs, documenting in the medical record and a discussion with the patient. This is exclusive of any procedure or technical performance.

## 2023-09-10 NOTE — OB RN TRIAGE NOTE - FALL HARM RISK - UNIVERSAL INTERVENTIONS
Bed in lowest position, wheels locked, appropriate side rails in place/Call bell, personal items and telephone in reach/Instruct patient to call for assistance before getting out of bed or chair/Non-slip footwear when patient is out of bed/George to call system/Physically safe environment - no spills, clutter or unnecessary equipment/Purposeful Proactive Rounding/Room/bathroom lighting operational, light cord in reach

## 2023-09-10 NOTE — OB POSTPARTUM TRIAGE NOTE - NSOBPROVIDERNOTE_OBGYN_ALL_OB_FT
32yo now P2 s/p  PPD 4, cHTN, T2DM, presents for elevated BPs r/o preeclampsia  - Continue monitoring BPs q15m  - f/u PELs  - continue NPH 10/17 and Lispro // for T2DM    Dr. Holguin and Dr. Meza to be made aware 32yo now P2 s/p  PPD 4, cHTN, T2DM, presents for elevated BPs r/o preeclampsia  - Continue monitoring BPs q15m  - f/u PELs  - continue NPH 10/17 and Lispro // for T2DM    Dr. Holguin and Dr. Meza to be made aware    ADDENDUM  patient s/p 4hrs of monitoring initial blood pressure elevated followed by 4hrs of normal blood pressures. Isolated false elevated blood pressure of 155/88 patient arm bent when trying to get up repeat immediately normal. Blood pressure precautions discussed. Pre-eclamptic precautions discussed

## 2023-09-10 NOTE — OB POSTPARTUM TRIAGE NOTE - NS_OBGYNHISTORY_OBGYN_ALL_OB_FT
OBHx:   - P1: FT , 10lbs, c/b PPH requiring 5u pRBCs, ?Bakri balloon, preeclampsia w/ severe features  - P2:  @36wks, 5lbs    GynHx: denies h/o ovarian cysts, fibroids, abnormal pap smears, STIs

## 2023-09-10 NOTE — OB POSTPARTUM TRIAGE NOTE - HISTORY OF PRESENT ILLNESS
34yo now P2 s/p  PPD 4, h/o of chronic hypertension and T2DM, currently on NPH 10/17 Lispro //.  34yo now P2 s/p  PPD 4, h/o of chronic hypertension and T2DM, presenting to L&D for elevated blood pressures. Patient states that home BPs today were 140-150s/90s, currently not on meds for cHTN. Denies headache, chest pain, SOB, RUQ/epigastric pain, or LE edema. Currently taking NPH 10/17 and Lispro /x/6 for T2DM, well controlled per patient.

## 2023-09-10 NOTE — OB POSTPARTUM TRIAGE NOTE - NSHPPHYSICALEXAM_GEN_ALL_CORE
T(C): 36.9 (09-10-23 @ 19:15), Max: 36.9 (09-10-23 @ 19:15)  HR: 98 (09-10-23 @ 19:19) (98 - 98)  BP: 131/93 (09-10-23 @ 19:19) (131/93 - 131/93)  RR: 18 (09-10-23 @ 19:15) (18 - 18)  BMI (kg/m2): 29.8 (09-06-23 @ 03:27)    Gen: A+OX3. NAD  CV: RRR, no murmurs, rubs, or gallops  Lungs: CTAB, no stridors, wheezes, or crackles  Abd: Soft, Nontender. No epigastric/RUQ tenderness on palpation  Reflexes: 2+ UE bilaterally T(C): 36.9 (09-10-23 @ 19:15), Max: 36.9 (09-10-23 @ 19:15)  HR: 98 (09-10-23 @ 19:19) (98 - 98)  BP: 131/93 (09-10-23 @ 19:19) (131/93 - 131/93)  RR: 18 (09-10-23 @ 19:15) (18 - 18)  BMI (kg/m2): 29.8 (09-06-23 @ 03:27)    Gen: A+OX3. NAD  CV: RRR, no murmurs, rubs, or gallops  Lungs: CTAB, no stridors, wheezes, or crackles  Abd: Soft, Nontender. No epigastric/RUQ tenderness on palpation  Reflexes: 2+ UE bilaterally  Extremeties: no pitting edema

## 2023-09-11 LAB
CREAT ?TM UR-MCNC: 22 MG/DL — SIGNIFICANT CHANGE UP
PROT ?TM UR-MCNC: 6 MG/DLG/24H — SIGNIFICANT CHANGE UP
PROT/CREAT UR-RTO: 0.3 RATIO — HIGH (ref 0–0.2)

## 2023-09-11 RX ORDER — IBUPROFEN 600 MG/1
600 TABLET, FILM COATED ORAL 3 TIMES DAILY
Qty: 40 | Refills: 0 | Status: ACTIVE | COMMUNITY
Start: 2023-09-11 | End: 1900-01-01

## 2023-09-13 DIAGNOSIS — Z3A.36 36 WEEKS GESTATION OF PREGNANCY: ICD-10-CM

## 2023-09-13 DIAGNOSIS — Z28.09 IMMUNIZATION NOT CARRIED OUT BECAUSE OF OTHER CONTRAINDICATION: ICD-10-CM

## 2023-09-13 DIAGNOSIS — O36.5930 MATERNAL CARE FOR OTHER KNOWN OR SUSPECTED POOR FETAL GROWTH, THIRD TRIMESTER, NOT APPLICABLE OR UNSPECIFIED: ICD-10-CM

## 2023-09-13 DIAGNOSIS — Z79.4 LONG TERM (CURRENT) USE OF INSULIN: ICD-10-CM

## 2023-09-14 ENCOUNTER — APPOINTMENT (OUTPATIENT)
Dept: ANTEPARTUM | Facility: CLINIC | Age: 33
End: 2023-09-14

## 2023-09-15 DIAGNOSIS — E11.9 TYPE 2 DIABETES MELLITUS WITHOUT COMPLICATIONS: ICD-10-CM

## 2023-09-15 DIAGNOSIS — Z79.4 LONG TERM (CURRENT) USE OF INSULIN: ICD-10-CM

## 2023-09-21 ENCOUNTER — APPOINTMENT (OUTPATIENT)
Dept: ANTEPARTUM | Facility: CLINIC | Age: 33
End: 2023-09-21

## 2023-09-21 PROBLEM — Z78.9 OTHER SPECIFIED HEALTH STATUS: Chronic | Status: ACTIVE | Noted: 2023-09-03

## 2023-09-28 ENCOUNTER — APPOINTMENT (OUTPATIENT)
Dept: ANTEPARTUM | Facility: CLINIC | Age: 33
End: 2023-09-28

## 2023-10-05 ENCOUNTER — APPOINTMENT (OUTPATIENT)
Dept: ANTEPARTUM | Facility: CLINIC | Age: 33
End: 2023-10-05

## 2023-10-23 ENCOUNTER — APPOINTMENT (OUTPATIENT)
Dept: CARDIOLOGY | Facility: CLINIC | Age: 33
End: 2023-10-23

## 2023-10-30 ENCOUNTER — APPOINTMENT (OUTPATIENT)
Dept: OBGYN | Facility: CLINIC | Age: 33
End: 2023-10-30

## 2023-11-06 ENCOUNTER — NON-APPOINTMENT (OUTPATIENT)
Age: 33
End: 2023-11-06

## 2023-11-06 ENCOUNTER — OUTPATIENT (OUTPATIENT)
Dept: OUTPATIENT SERVICES | Facility: HOSPITAL | Age: 33
LOS: 1 days | End: 2023-11-06
Payer: MEDICAID

## 2023-11-06 ENCOUNTER — APPOINTMENT (OUTPATIENT)
Dept: OBGYN | Facility: CLINIC | Age: 33
End: 2023-11-06
Payer: MEDICAID

## 2023-11-06 VITALS
BODY MASS INDEX: 28.56 KG/M2 | HEIGHT: 67 IN | SYSTOLIC BLOOD PRESSURE: 120 MMHG | DIASTOLIC BLOOD PRESSURE: 80 MMHG | WEIGHT: 182 LBS

## 2023-11-06 PROCEDURE — 99213 OFFICE O/P EST LOW 20 MIN: CPT | Mod: TH

## 2023-11-06 PROCEDURE — 99213 OFFICE O/P EST LOW 20 MIN: CPT

## 2023-11-21 NOTE — PROCEDURE NOTE - ATTENDING PROVIDER
Take over the counter Mucinex per package directions with a full glass of water to help loosen chest congestion.     Most upper respiratory infections are a viral infection, therefore antibiotics are not indicated. It can affect the nose, throat and upper air passages. The most common type is nasopharyngitis, typically referred to as \"the common cold\".  Symptoms can include dry or productive cough, nasal congestion and drainage, sore throat, and ear pain or feeling plugged.  These symptoms can last any where from 7-14 days.    Follow up with MD or return to clinic for new or worsening symptoms.  Take Tylenol or Ibuprofen for pain or fever if not contraindicated.  Use Flonase 2 sprays each nostril daily.  Use saline nasal spray and warm steam for congestion.  Use cough drops for cough.  Throat lozenges for sore throat or sprays to soothe throat.  Increase fluids.   Warm fluids for comfort.   Tea with honey (dark honey, preferably buckwheat honey) or 1-2 teaspoon of honey by itself to coat throat.  Cold Eeze zinc lozenges to help reduce length of cold symptoms.  You can try a decongestant of your choice.  Breath Right strips to help with nasal congestion.  Rest as needed.  Eat soups and other clear broths to maintain good nutrition.  Good hand washing to prevent spread of the virus.     
Leonela

## 2024-02-01 ENCOUNTER — APPOINTMENT (OUTPATIENT)
Dept: ENDOCRINOLOGY | Facility: CLINIC | Age: 34
End: 2024-02-01

## 2025-03-04 DIAGNOSIS — Z34.90 ENCOUNTER FOR SUPERVISION OF NORMAL PREGNANCY, UNSPECIFIED, UNSPECIFIED TRIMESTER: ICD-10-CM

## 2025-03-05 PROBLEM — I10 ESSENTIAL (PRIMARY) HYPERTENSION: Chronic | Status: ACTIVE | Noted: 2023-09-05

## 2025-03-05 PROBLEM — E11.9 TYPE 2 DIABETES MELLITUS WITHOUT COMPLICATIONS: Chronic | Status: ACTIVE | Noted: 2023-09-05

## 2025-03-05 LAB
BASOPHILS # BLD AUTO: 0.04 K/UL
BASOPHILS NFR BLD AUTO: 0.6 %
EOSINOPHIL # BLD AUTO: 0.1 K/UL
EOSINOPHIL NFR BLD AUTO: 1.5 %
ESTIMATED AVERAGE GLUCOSE: 148 MG/DL
HBA1C MFR BLD HPLC: 6.8 %
HCG SERPL-MCNC: 1981 MIU/ML
HCT VFR BLD CALC: 44.7 %
HCV AB SER QL: NONREACTIVE
HCV S/CO RATIO: 0.05 COI
HGB BLD-MCNC: 13.7 G/DL
IMM GRANULOCYTES NFR BLD AUTO: 0.6 %
LYMPHOCYTES # BLD AUTO: 2.31 K/UL
LYMPHOCYTES NFR BLD AUTO: 34.5 %
MAN DIFF?: NORMAL
MCHC RBC-ENTMCNC: 24 PG
MCHC RBC-ENTMCNC: 30.6 G/DL
MCV RBC AUTO: 78.4 FL
MONOCYTES # BLD AUTO: 0.57 K/UL
MONOCYTES NFR BLD AUTO: 8.5 %
NEUTROPHILS # BLD AUTO: 3.64 K/UL
NEUTROPHILS NFR BLD AUTO: 54.3 %
PLATELET # BLD AUTO: 393 K/UL
PMV BLD AUTO: 0 /100 WBCS
PMV BLD: 10.2 FL
RBC # BLD: 5.7 M/UL
RBC # FLD: 16 %
WBC # FLD AUTO: 6.7 K/UL

## 2025-03-06 LAB
ABORH: NORMAL
ANTIBODY SCREEN: NORMAL
HIV1+2 AB SPEC QL IA.RAPID: NONREACTIVE
MEV IGG FLD QL IA: 163 AU/ML
MEV IGG+IGM SER-IMP: POSITIVE
MUV AB SER-ACNC: NEGATIVE
MUV IGG SER QL IA: <5 AU/ML
RUBV IGG FLD-ACNC: 9.56 INDEX
RUBV IGG SER-IMP: POSITIVE
T PALLIDUM AB SER QL IA: NEGATIVE
VZV AB TITR SER: POSITIVE
VZV IGG SER IF-ACNC: 12.1 S/CO

## 2025-03-07 ENCOUNTER — NON-APPOINTMENT (OUTPATIENT)
Age: 35
End: 2025-03-07

## 2025-03-07 ENCOUNTER — OUTPATIENT (OUTPATIENT)
Dept: OUTPATIENT SERVICES | Facility: HOSPITAL | Age: 35
LOS: 1 days | End: 2025-03-07

## 2025-03-07 ENCOUNTER — APPOINTMENT (OUTPATIENT)
Dept: ANTEPARTUM | Facility: CLINIC | Age: 35
End: 2025-03-07

## 2025-03-07 ENCOUNTER — OUTPATIENT (OUTPATIENT)
Dept: OUTPATIENT SERVICES | Facility: HOSPITAL | Age: 35
LOS: 1 days | End: 2025-03-07
Payer: COMMERCIAL

## 2025-03-07 VITALS
HEART RATE: 120 BPM | WEIGHT: 182 LBS | BODY MASS INDEX: 28.51 KG/M2 | OXYGEN SATURATION: 100 % | DIASTOLIC BLOOD PRESSURE: 96 MMHG | SYSTOLIC BLOOD PRESSURE: 154 MMHG

## 2025-03-07 DIAGNOSIS — Z34.90 ENCOUNTER FOR SUPERVISION OF NORMAL PREGNANCY, UNSPECIFIED, UNSPECIFIED TRIMESTER: ICD-10-CM

## 2025-03-07 DIAGNOSIS — N91.2 AMENORRHEA, UNSPECIFIED: ICD-10-CM

## 2025-03-07 DIAGNOSIS — O09.93 SUPERVISION OF HIGH RISK PREGNANCY, UNSPECIFIED, THIRD TRIMESTER: ICD-10-CM

## 2025-03-07 DIAGNOSIS — O10.911 UNSPECIFIED PRE-EXISTING HYPERTENSION COMPLICATING PREGNANCY, FIRST TRIMESTER: ICD-10-CM

## 2025-03-07 DIAGNOSIS — O24.111 PRE-EXISTING TYPE 2 DIABETES MELLITUS, IN PREGNANCY, FIRST TRIMESTER: ICD-10-CM

## 2025-03-07 DIAGNOSIS — Z3A.01 LESS THAN 8 WEEKS GESTATION OF PREGNANCY: ICD-10-CM

## 2025-03-07 LAB
BACTERIA UR CULT: NORMAL
BP DIAS: 96 MM HG
BP SYS: 154 MM HG
FETAL HEART RATE (BPM): NORMAL
HBV SURFACE AG SER QL: NONREACTIVE
LEAD BLD-MCNC: <1 UG/DL
OB COMMENTS: NORMAL
SCHEDULED VISIT: YES
URINE ALBUMIN/PROTEIN: NORMAL
URINE GLUCOSE: NORMAL
URINE KETONES: NORMAL
WEEKS GESTATION: 5

## 2025-03-07 PROCEDURE — ZZZZZ: CPT

## 2025-03-07 PROCEDURE — 81002 URINALYSIS NONAUTO W/O SCOPE: CPT

## 2025-03-07 PROCEDURE — 76856 US EXAM PELVIC COMPLETE: CPT

## 2025-03-07 PROCEDURE — 99213 OFFICE O/P EST LOW 20 MIN: CPT | Mod: 25

## 2025-03-07 PROCEDURE — 82962 GLUCOSE BLOOD TEST: CPT

## 2025-03-07 PROCEDURE — 76856 US EXAM PELVIC COMPLETE: CPT | Mod: 26

## 2025-03-07 PROCEDURE — 82948 REAGENT STRIP/BLOOD GLUCOSE: CPT

## 2025-03-07 RX ORDER — AMLODIPINE BESYLATE 5 MG/1
5 TABLET ORAL DAILY
Qty: 30 | Refills: 3 | Status: ACTIVE | COMMUNITY
Start: 2025-03-07 | End: 1900-01-01

## 2025-03-07 RX ORDER — INSULIN LISPRO 100 [IU]/ML
100 INJECTION, SOLUTION SUBCUTANEOUS 3 TIMES DAILY
Qty: 5 | Refills: 3 | Status: ACTIVE | COMMUNITY
Start: 2025-03-07 | End: 1900-01-01

## 2025-03-17 ENCOUNTER — NON-APPOINTMENT (OUTPATIENT)
Age: 35
End: 2025-03-17

## 2025-03-19 ENCOUNTER — APPOINTMENT (OUTPATIENT)
Dept: ANTEPARTUM | Facility: CLINIC | Age: 35
End: 2025-03-19
Payer: COMMERCIAL

## 2025-03-19 ENCOUNTER — OUTPATIENT (OUTPATIENT)
Dept: OUTPATIENT SERVICES | Facility: HOSPITAL | Age: 35
LOS: 1 days | End: 2025-03-19
Payer: COMMERCIAL

## 2025-03-19 ENCOUNTER — ASOB RESULT (OUTPATIENT)
Age: 35
End: 2025-03-19

## 2025-03-19 VITALS
HEART RATE: 95 BPM | OXYGEN SATURATION: 100 % | DIASTOLIC BLOOD PRESSURE: 65 MMHG | SYSTOLIC BLOOD PRESSURE: 129 MMHG | BODY MASS INDEX: 28.51 KG/M2 | WEIGHT: 182 LBS

## 2025-03-19 DIAGNOSIS — Z00.00 ENCOUNTER FOR GENERAL ADULT MEDICAL EXAMINATION WITHOUT ABNORMAL FINDINGS: ICD-10-CM

## 2025-03-19 LAB
BILIRUB UR QL STRIP: NEGATIVE
CLARITY UR: CLEAR
COLLECTION METHOD: NORMAL
FETAL HEART RATE (BPM): 142
GLUCOSE BLDC GLUCOMTR-MCNC: 95
GLUCOSE UR-MCNC: NEGATIVE
HCG UR QL: 0.2 EU/DL
HGB UR QL STRIP.AUTO: NEGATIVE
KETONES UR-MCNC: NEGATIVE
LEUKOCYTE ESTERASE UR QL STRIP: NORMAL
NITRITE UR QL STRIP: NEGATIVE
OB COMMENTS: NORMAL
PH UR STRIP: 7
PROT UR STRIP-MCNC: NEGATIVE
SCHEDULED VISIT: YES
SP GR UR STRIP: 1.01
URINE ALBUMIN/PROTEIN: NEGATIVE
URINE GLUCOSE: NEGATIVE
URINE KETONES: NEGATIVE
WEEKS GESTATION: 6.6

## 2025-03-19 PROCEDURE — 82948 REAGENT STRIP/BLOOD GLUCOSE: CPT

## 2025-03-19 PROCEDURE — 76815 OB US LIMITED FETUS(S): CPT | Mod: 26

## 2025-03-19 PROCEDURE — 99214 OFFICE O/P EST MOD 30 MIN: CPT | Mod: 25

## 2025-03-19 PROCEDURE — 81002 URINALYSIS NONAUTO W/O SCOPE: CPT

## 2025-03-19 PROCEDURE — 82962 GLUCOSE BLOOD TEST: CPT

## 2025-03-19 PROCEDURE — 76815 OB US LIMITED FETUS(S): CPT

## 2025-03-21 DIAGNOSIS — O10.919 UNSPECIFIED PRE-EXISTING HYPERTENSION COMPLICATING PREGNANCY, UNSPECIFIED TRIMESTER: ICD-10-CM

## 2025-03-21 DIAGNOSIS — O36.80X0 PREGNANCY WITH INCONCLUSIVE FETAL VIABILITY, NOT APPLICABLE OR UNSPECIFIED: ICD-10-CM

## 2025-03-21 DIAGNOSIS — O09.299 SUPERVISION OF PREGNANCY WITH OTHER POOR REPRODUCTIVE OR OBSTETRIC HISTORY, UNSPECIFIED TRIMESTER: ICD-10-CM

## 2025-03-21 DIAGNOSIS — O24.111 PRE-EXISTING TYPE 2 DIABETES MELLITUS, IN PREGNANCY, FIRST TRIMESTER: ICD-10-CM

## 2025-03-21 DIAGNOSIS — Z3A.01 LESS THAN 8 WEEKS GESTATION OF PREGNANCY: ICD-10-CM

## 2025-03-31 ENCOUNTER — OUTPATIENT (OUTPATIENT)
Dept: OUTPATIENT SERVICES | Facility: HOSPITAL | Age: 35
LOS: 1 days | End: 2025-03-31
Payer: COMMERCIAL

## 2025-03-31 DIAGNOSIS — E11.9 TYPE 2 DIABETES MELLITUS WITHOUT COMPLICATIONS: ICD-10-CM

## 2025-03-31 DIAGNOSIS — O10.911 UNSPECIFIED PRE-EXISTING HYPERTENSION COMPLICATING PREGNANCY, FIRST TRIMESTER: ICD-10-CM

## 2025-03-31 PROCEDURE — 84443 ASSAY THYROID STIM HORMONE: CPT

## 2025-03-31 PROCEDURE — 83036 HEMOGLOBIN GLYCOSYLATED A1C: CPT

## 2025-03-31 PROCEDURE — 36415 COLL VENOUS BLD VENIPUNCTURE: CPT

## 2025-03-31 PROCEDURE — 80053 COMPREHEN METABOLIC PANEL: CPT

## 2025-04-01 ENCOUNTER — NON-APPOINTMENT (OUTPATIENT)
Age: 35
End: 2025-04-01

## 2025-04-01 DIAGNOSIS — E11.9 TYPE 2 DIABETES MELLITUS WITHOUT COMPLICATIONS: ICD-10-CM

## 2025-04-01 DIAGNOSIS — O10.911 UNSPECIFIED PRE-EXISTING HYPERTENSION COMPLICATING PREGNANCY, FIRST TRIMESTER: ICD-10-CM

## 2025-04-02 ENCOUNTER — RESULT CHARGE (OUTPATIENT)
Age: 35
End: 2025-04-02

## 2025-04-02 ENCOUNTER — NON-APPOINTMENT (OUTPATIENT)
Age: 35
End: 2025-04-02

## 2025-04-02 ENCOUNTER — APPOINTMENT (OUTPATIENT)
Dept: ANTEPARTUM | Facility: CLINIC | Age: 35
End: 2025-04-02
Payer: COMMERCIAL

## 2025-04-02 ENCOUNTER — OUTPATIENT (OUTPATIENT)
Dept: OUTPATIENT SERVICES | Facility: HOSPITAL | Age: 35
LOS: 1 days | End: 2025-04-02
Payer: COMMERCIAL

## 2025-04-02 VITALS
DIASTOLIC BLOOD PRESSURE: 80 MMHG | WEIGHT: 193 LBS | SYSTOLIC BLOOD PRESSURE: 139 MMHG | BODY MASS INDEX: 30.23 KG/M2 | OXYGEN SATURATION: 98 % | HEART RATE: 91 BPM

## 2025-04-02 DIAGNOSIS — Z34.90 ENCOUNTER FOR SUPERVISION OF NORMAL PREGNANCY, UNSPECIFIED, UNSPECIFIED TRIMESTER: ICD-10-CM

## 2025-04-02 DIAGNOSIS — O21.9 VOMITING OF PREGNANCY, UNSPECIFIED: ICD-10-CM

## 2025-04-02 DIAGNOSIS — O09.90 SUPERVISION OF HIGH RISK PREGNANCY, UNSPECIFIED, UNSPECIFIED TRIMESTER: ICD-10-CM

## 2025-04-02 LAB
BILIRUB UR QL STRIP: NORMAL
BP DIAS: 80 MM HG
BP SYS: 138 MM HG
CLARITY UR: CLEAR
COLLECTION METHOD: NORMAL
FETAL HEART RATE (BPM): 187
FETAL MOVEMENT: PRESENT
GLUCOSE BLDC GLUCOMTR-MCNC: 92
GLUCOSE UR-MCNC: NORMAL
HCG UR QL: 0.2 EU/DL
HGB UR QL STRIP.AUTO: NORMAL
KETONES UR-MCNC: NORMAL
LEUKOCYTE ESTERASE UR QL STRIP: NORMAL
NITRITE UR QL STRIP: NORMAL
OB COMMENTS: NORMAL
PH UR STRIP: 6.5
PROT UR STRIP-MCNC: NORMAL
SCHEDULED VISIT: YES
SP GR UR STRIP: 1.01
URINE ALBUMIN/PROTEIN: NORMAL
URINE GLUCOSE: NORMAL
URINE KETONES: NORMAL
WEEKS GESTATION: 8.6

## 2025-04-02 PROCEDURE — 99214 OFFICE O/P EST MOD 30 MIN: CPT

## 2025-04-02 PROCEDURE — 82948 REAGENT STRIP/BLOOD GLUCOSE: CPT

## 2025-04-02 PROCEDURE — 82962 GLUCOSE BLOOD TEST: CPT

## 2025-04-02 PROCEDURE — 81002 URINALYSIS NONAUTO W/O SCOPE: CPT

## 2025-04-02 RX ORDER — ONDANSETRON 4 MG/1
4 TABLET, ORALLY DISINTEGRATING ORAL 3 TIMES DAILY
Qty: 30 | Refills: 0 | Status: ACTIVE | COMMUNITY
Start: 2025-04-02 | End: 1900-01-01

## 2025-04-04 DIAGNOSIS — Z3A.08 8 WEEKS GESTATION OF PREGNANCY: ICD-10-CM

## 2025-04-04 DIAGNOSIS — O24.111 PRE-EXISTING TYPE 2 DIABETES MELLITUS, IN PREGNANCY, FIRST TRIMESTER: ICD-10-CM

## 2025-04-04 DIAGNOSIS — O10.911 UNSPECIFIED PRE-EXISTING HYPERTENSION COMPLICATING PREGNANCY, FIRST TRIMESTER: ICD-10-CM

## 2025-04-04 DIAGNOSIS — Z87.59 PERSONAL HISTORY OF OTHER COMPLICATIONS OF PREGNANCY, CHILDBIRTH AND THE PUERPERIUM: ICD-10-CM

## 2025-04-04 DIAGNOSIS — O21.9 VOMITING OF PREGNANCY, UNSPECIFIED: ICD-10-CM

## 2025-04-30 ENCOUNTER — APPOINTMENT (OUTPATIENT)
Dept: ANTEPARTUM | Facility: CLINIC | Age: 35
End: 2025-04-30
Payer: COMMERCIAL

## 2025-04-30 ENCOUNTER — NON-APPOINTMENT (OUTPATIENT)
Age: 35
End: 2025-04-30

## 2025-04-30 ENCOUNTER — ASOB RESULT (OUTPATIENT)
Age: 35
End: 2025-04-30

## 2025-04-30 ENCOUNTER — OUTPATIENT (OUTPATIENT)
Dept: OUTPATIENT SERVICES | Facility: HOSPITAL | Age: 35
LOS: 1 days | End: 2025-04-30
Payer: COMMERCIAL

## 2025-04-30 VITALS
DIASTOLIC BLOOD PRESSURE: 78 MMHG | TEMPERATURE: 97.8 F | HEIGHT: 67 IN | OXYGEN SATURATION: 100 % | WEIGHT: 182 LBS | SYSTOLIC BLOOD PRESSURE: 135 MMHG | HEART RATE: 93 BPM | BODY MASS INDEX: 28.56 KG/M2

## 2025-04-30 DIAGNOSIS — O09.291 SUPERVISION OF PREGNANCY WITH OTHER POOR REPRODUCTIVE OR OBSTETRIC HISTORY, FIRST TRIMESTER: ICD-10-CM

## 2025-04-30 DIAGNOSIS — R21 RASH AND OTHER NONSPECIFIC SKIN ERUPTION: ICD-10-CM

## 2025-04-30 DIAGNOSIS — O10.011 PRE-EXISTING ESSENTIAL HYPERTENSION COMPLICATING PREGNANCY, FIRST TRIMESTER: ICD-10-CM

## 2025-04-30 DIAGNOSIS — Z34.90 ENCOUNTER FOR SUPERVISION OF NORMAL PREGNANCY, UNSPECIFIED, UNSPECIFIED TRIMESTER: ICD-10-CM

## 2025-04-30 DIAGNOSIS — Z3A.12 12 WEEKS GESTATION OF PREGNANCY: ICD-10-CM

## 2025-04-30 DIAGNOSIS — O10.919 UNSPECIFIED PRE-EXISTING HYPERTENSION COMPLICATING PREGNANCY, UNSPECIFIED TRIMESTER: ICD-10-CM

## 2025-04-30 DIAGNOSIS — O24.111 PRE-EXISTING TYPE 2 DIABETES MELLITUS, IN PREGNANCY, FIRST TRIMESTER: ICD-10-CM

## 2025-04-30 DIAGNOSIS — O36.80X0 PREGNANCY WITH INCONCLUSIVE FETAL VIABILITY, NOT APPLICABLE OR UNSPECIFIED: ICD-10-CM

## 2025-04-30 DIAGNOSIS — Z36.89 ENCOUNTER FOR OTHER SPECIFIED ANTENATAL SCREENING: ICD-10-CM

## 2025-04-30 DIAGNOSIS — O09.90 SUPERVISION OF HIGH RISK PREGNANCY, UNSPECIFIED, UNSPECIFIED TRIMESTER: ICD-10-CM

## 2025-04-30 DIAGNOSIS — O09.299 SUPERVISION OF PREGNANCY WITH OTHER POOR REPRODUCTIVE OR OBSTETRIC HISTORY, UNSPECIFIED TRIMESTER: ICD-10-CM

## 2025-04-30 LAB
BILIRUB UR QL STRIP: NORMAL
CLARITY UR: CLEAR
COLLECTION METHOD: NORMAL
GLUCOSE UR-MCNC: NORMAL
HCG UR QL: 0.2 EU/DL
HGB UR QL STRIP.AUTO: NORMAL
KETONES UR-MCNC: NORMAL
LEUKOCYTE ESTERASE UR QL STRIP: NORMAL
NITRITE UR QL STRIP: NORMAL
OB COMMENTS: NORMAL
PH UR STRIP: 7
PROT UR STRIP-MCNC: NORMAL
SCHEDULED VISIT: YES
SP GR UR STRIP: 1.01
URINE ALBUMIN/PROTEIN: NORMAL
URINE GLUCOSE: NORMAL
URINE KETONES: NORMAL
WEEKS GESTATION: 12.6

## 2025-04-30 PROCEDURE — 76813 OB US NUCHAL MEAS 1 GEST: CPT | Mod: 26

## 2025-04-30 PROCEDURE — 99214 OFFICE O/P EST MOD 30 MIN: CPT | Mod: 25

## 2025-04-30 PROCEDURE — 76801 OB US < 14 WKS SINGLE FETUS: CPT | Mod: 26,59

## 2025-04-30 PROCEDURE — 76801 OB US < 14 WKS SINGLE FETUS: CPT

## 2025-04-30 PROCEDURE — 82962 GLUCOSE BLOOD TEST: CPT

## 2025-04-30 PROCEDURE — 76813 OB US NUCHAL MEAS 1 GEST: CPT

## 2025-04-30 RX ORDER — PEN NEEDLE, DIABETIC 33 GX5/32"
33G X 4 MM NEEDLE, DISPOSABLE MISCELLANEOUS
Qty: 100 | Refills: 3 | Status: ACTIVE | COMMUNITY
Start: 2025-04-30 | End: 1900-01-01

## 2025-04-30 RX ORDER — HYDROCORTISONE 1 G/100G
1 OINTMENT TOPICAL TWICE DAILY
Qty: 1 | Refills: 0 | Status: ACTIVE | COMMUNITY
Start: 2025-04-30 | End: 1900-01-01

## 2025-04-30 RX ORDER — LANCETS 33 GAUGE
EACH MISCELLANEOUS
Qty: 150 | Refills: 2 | Status: ACTIVE | COMMUNITY
Start: 2025-04-30 | End: 1900-01-01

## 2025-04-30 RX ORDER — ASPIRIN 81 MG/1
81 TABLET, DELAYED RELEASE ORAL DAILY
Qty: 60 | Refills: 3 | Status: ACTIVE | COMMUNITY
Start: 2025-04-30 | End: 1900-01-01

## 2025-04-30 RX ORDER — CLOTRIMAZOLE 10 MG/G
1 CREAM TOPICAL
Qty: 1 | Refills: 0 | Status: ACTIVE | COMMUNITY
Start: 2025-04-30 | End: 1900-01-01

## 2025-04-30 RX ORDER — INSULIN ASPART 100 [IU]/ML
100 INJECTION, SOLUTION INTRAVENOUS; SUBCUTANEOUS
Qty: 3 | Refills: 5 | Status: ACTIVE | COMMUNITY
Start: 2025-04-30 | End: 1900-01-01

## 2025-04-30 RX ORDER — INSULIN DEGLUDEC 200 U/ML
200 INJECTION, SOLUTION SUBCUTANEOUS AT BEDTIME
Qty: 5 | Refills: 3 | Status: ACTIVE | COMMUNITY
Start: 2025-04-30 | End: 1900-01-01

## 2025-04-30 RX ORDER — BLOOD-GLUCOSE METER
KIT MISCELLANEOUS 4 TIMES DAILY
Qty: 100 | Refills: 6 | Status: ACTIVE | COMMUNITY
Start: 2025-04-30 | End: 1900-01-01

## 2025-05-27 ENCOUNTER — NON-APPOINTMENT (OUTPATIENT)
Age: 35
End: 2025-05-27

## 2025-05-29 ENCOUNTER — ASOB RESULT (OUTPATIENT)
Age: 35
End: 2025-05-29

## 2025-05-29 ENCOUNTER — OUTPATIENT (OUTPATIENT)
Dept: OUTPATIENT SERVICES | Facility: HOSPITAL | Age: 35
LOS: 1 days | End: 2025-05-29
Payer: COMMERCIAL

## 2025-05-29 ENCOUNTER — NON-APPOINTMENT (OUTPATIENT)
Age: 35
End: 2025-05-29

## 2025-05-29 ENCOUNTER — APPOINTMENT (OUTPATIENT)
Dept: ANTEPARTUM | Facility: CLINIC | Age: 35
End: 2025-05-29
Payer: COMMERCIAL

## 2025-05-29 VITALS
HEART RATE: 110 BPM | BODY MASS INDEX: 29.29 KG/M2 | WEIGHT: 187 LBS | OXYGEN SATURATION: 99 % | SYSTOLIC BLOOD PRESSURE: 151 MMHG | DIASTOLIC BLOOD PRESSURE: 90 MMHG

## 2025-05-29 DIAGNOSIS — Z34.90 ENCOUNTER FOR SUPERVISION OF NORMAL PREGNANCY, UNSPECIFIED, UNSPECIFIED TRIMESTER: ICD-10-CM

## 2025-05-29 LAB
BILIRUB UR QL STRIP: NORMAL
BP DIAS: 90 MM HG
BP SYS: 151 MM HG
CLARITY UR: CLEAR
COLLECTION METHOD: NORMAL
FETAL HEART RATE (BPM): 154
FETAL MOVEMENT: PRESENT
GLUCOSE BLDC GLUCOMTR-MCNC: 59
GLUCOSE UR-MCNC: NORMAL
HCG UR QL: 0.2 EU/DL
HGB UR QL STRIP.AUTO: NORMAL
KETONES UR-MCNC: NORMAL
LEUKOCYTE ESTERASE UR QL STRIP: NORMAL
NITRITE UR QL STRIP: NORMAL
OB COMMENTS: NORMAL
PH UR STRIP: 6
PROT UR STRIP-MCNC: NORMAL
SCHEDULED VISIT: YES
SP GR UR STRIP: 1.02
URINE ALBUMIN/PROTEIN: NORMAL
URINE GLUCOSE: NORMAL
URINE KETONES: NORMAL
WEEKS GESTATION: 17

## 2025-05-29 PROCEDURE — 76805 OB US >/= 14 WKS SNGL FETUS: CPT | Mod: 26

## 2025-05-29 PROCEDURE — 99215 OFFICE O/P EST HI 40 MIN: CPT | Mod: 25

## 2025-05-29 PROCEDURE — 76805 OB US >/= 14 WKS SNGL FETUS: CPT

## 2025-05-29 PROCEDURE — 82948 REAGENT STRIP/BLOOD GLUCOSE: CPT

## 2025-05-29 PROCEDURE — 81002 URINALYSIS NONAUTO W/O SCOPE: CPT

## 2025-05-29 PROCEDURE — 82962 GLUCOSE BLOOD TEST: CPT

## 2025-06-02 DIAGNOSIS — O09.292 SUPERVISION OF PREGNANCY WITH OTHER POOR REPRODUCTIVE OR OBSTETRIC HISTORY, SECOND TRIMESTER: ICD-10-CM

## 2025-06-02 DIAGNOSIS — Z87.59 PERSONAL HISTORY OF OTHER COMPLICATIONS OF PREGNANCY, CHILDBIRTH AND THE PUERPERIUM: ICD-10-CM

## 2025-06-02 DIAGNOSIS — O24.112 PRE-EXISTING TYPE 2 DIABETES MELLITUS, IN PREGNANCY, SECOND TRIMESTER: ICD-10-CM

## 2025-06-02 DIAGNOSIS — O10.919 UNSPECIFIED PRE-EXISTING HYPERTENSION COMPLICATING PREGNANCY, UNSPECIFIED TRIMESTER: ICD-10-CM

## 2025-06-02 DIAGNOSIS — O24.912 UNSPECIFIED DIABETES MELLITUS IN PREGNANCY, SECOND TRIMESTER: ICD-10-CM

## 2025-06-12 ENCOUNTER — OUTPATIENT (OUTPATIENT)
Dept: OUTPATIENT SERVICES | Facility: HOSPITAL | Age: 35
LOS: 1 days | End: 2025-06-12
Payer: COMMERCIAL

## 2025-06-12 ENCOUNTER — NON-APPOINTMENT (OUTPATIENT)
Age: 35
End: 2025-06-12

## 2025-06-12 ENCOUNTER — APPOINTMENT (OUTPATIENT)
Dept: ANTEPARTUM | Facility: CLINIC | Age: 35
End: 2025-06-12

## 2025-06-12 VITALS
OXYGEN SATURATION: 98 % | WEIGHT: 190 LBS | BODY MASS INDEX: 29.76 KG/M2 | DIASTOLIC BLOOD PRESSURE: 87 MMHG | SYSTOLIC BLOOD PRESSURE: 135 MMHG | HEART RATE: 98 BPM

## 2025-06-12 DIAGNOSIS — O09.299 SUPERVISION OF PREGNANCY WITH OTHER POOR REPRODUCTIVE OR OBSTETRIC HISTORY, UNSPECIFIED TRIMESTER: ICD-10-CM

## 2025-06-12 DIAGNOSIS — O24.112 PRE-EXISTING TYPE 2 DIABETES MELLITUS, IN PREGNANCY, SECOND TRIMESTER: ICD-10-CM

## 2025-06-12 DIAGNOSIS — Z3A.19 19 WEEKS GESTATION OF PREGNANCY: ICD-10-CM

## 2025-06-12 DIAGNOSIS — O09.90 SUPERVISION OF HIGH RISK PREGNANCY, UNSPECIFIED, UNSPECIFIED TRIMESTER: ICD-10-CM

## 2025-06-12 DIAGNOSIS — O10.919 UNSPECIFIED PRE-EXISTING HYPERTENSION COMPLICATING PREGNANCY, UNSPECIFIED TRIMESTER: ICD-10-CM

## 2025-06-12 LAB
BILIRUB UR QL STRIP: NORMAL
BP DIAS: 87 MM HG
BP SYS: 135 MM HG
CLARITY UR: CLEAR
COLLECTION METHOD: NORMAL
FETAL MOVEMENT: PRESENT
GLUCOSE BLDC GLUCOMTR-MCNC: 69
GLUCOSE BLDC GLUCOMTR-MCNC: 77
GLUCOSE UR-MCNC: NORMAL
HCG UR QL: 0.2 EU/DL
HGB UR QL STRIP.AUTO: NORMAL
KETONES UR-MCNC: NORMAL
LEUKOCYTE ESTERASE UR QL STRIP: NORMAL
NITRITE UR QL STRIP: NORMAL
OB COMMENTS: NORMAL
PH UR STRIP: 6
PROT UR STRIP-MCNC: NORMAL
SCHEDULED VISIT: YES
SP GR UR STRIP: 1.02
URINE ALBUMIN/PROTEIN: NORMAL
URINE GLUCOSE: NORMAL
URINE KETONES: NORMAL
WEEKS GESTATION: 19

## 2025-06-12 PROCEDURE — 81002 URINALYSIS NONAUTO W/O SCOPE: CPT

## 2025-06-12 PROCEDURE — 82962 GLUCOSE BLOOD TEST: CPT

## 2025-06-12 PROCEDURE — 99213 OFFICE O/P EST LOW 20 MIN: CPT

## 2025-06-12 PROCEDURE — ZZZZZ: CPT

## 2025-06-12 PROCEDURE — 82948 REAGENT STRIP/BLOOD GLUCOSE: CPT

## 2025-06-24 ENCOUNTER — APPOINTMENT (OUTPATIENT)
Dept: ANTEPARTUM | Facility: CLINIC | Age: 35
End: 2025-06-24
Payer: COMMERCIAL

## 2025-06-24 ENCOUNTER — OUTPATIENT (OUTPATIENT)
Dept: OUTPATIENT SERVICES | Facility: HOSPITAL | Age: 35
LOS: 1 days | End: 2025-06-24
Payer: COMMERCIAL

## 2025-06-24 ENCOUNTER — ASOB RESULT (OUTPATIENT)
Age: 35
End: 2025-06-24

## 2025-06-24 VITALS
SYSTOLIC BLOOD PRESSURE: 141 MMHG | WEIGHT: 192 LBS | BODY MASS INDEX: 30.07 KG/M2 | DIASTOLIC BLOOD PRESSURE: 82 MMHG | HEART RATE: 88 BPM | OXYGEN SATURATION: 98 %

## 2025-06-24 DIAGNOSIS — Z34.90 ENCOUNTER FOR SUPERVISION OF NORMAL PREGNANCY, UNSPECIFIED, UNSPECIFIED TRIMESTER: ICD-10-CM

## 2025-06-24 PROCEDURE — 99214 OFFICE O/P EST MOD 30 MIN: CPT | Mod: 25

## 2025-06-24 PROCEDURE — 76811 OB US DETAILED SNGL FETUS: CPT | Mod: 26

## 2025-06-24 PROCEDURE — 93976 VASCULAR STUDY: CPT | Mod: 26

## 2025-06-24 PROCEDURE — 76811 OB US DETAILED SNGL FETUS: CPT

## 2025-06-24 PROCEDURE — 93976 VASCULAR STUDY: CPT

## 2025-06-25 ENCOUNTER — APPOINTMENT (OUTPATIENT)
Dept: PEDIATRIC CARDIOLOGY | Facility: CLINIC | Age: 35
End: 2025-06-25
Payer: COMMERCIAL

## 2025-06-25 DIAGNOSIS — O09.299 SUPERVISION OF PREGNANCY WITH OTHER POOR REPRODUCTIVE OR OBSTETRIC HISTORY, UNSPECIFIED TRIMESTER: ICD-10-CM

## 2025-06-25 DIAGNOSIS — O10.919 UNSPECIFIED PRE-EXISTING HYPERTENSION COMPLICATING PREGNANCY, UNSPECIFIED TRIMESTER: ICD-10-CM

## 2025-06-25 DIAGNOSIS — O24.112 PRE-EXISTING TYPE 2 DIABETES MELLITUS, IN PREGNANCY, SECOND TRIMESTER: ICD-10-CM

## 2025-06-25 DIAGNOSIS — Z3A.20 20 WEEKS GESTATION OF PREGNANCY: ICD-10-CM

## 2025-06-25 PROCEDURE — 76825 ECHO EXAM OF FETAL HEART: CPT

## 2025-06-25 PROCEDURE — 99205 OFFICE O/P NEW HI 60 MIN: CPT

## 2025-06-25 PROCEDURE — 76827 ECHO EXAM OF FETAL HEART: CPT

## 2025-06-25 PROCEDURE — 93325 DOPPLER ECHO COLOR FLOW MAPG: CPT

## 2025-07-01 ENCOUNTER — RX RENEWAL (OUTPATIENT)
Age: 35
End: 2025-07-01

## 2025-07-03 ENCOUNTER — APPOINTMENT (OUTPATIENT)
Dept: OBGYN | Facility: CLINIC | Age: 35
End: 2025-07-03
Payer: COMMERCIAL

## 2025-07-03 VITALS
DIASTOLIC BLOOD PRESSURE: 88 MMHG | BODY MASS INDEX: 29.98 KG/M2 | SYSTOLIC BLOOD PRESSURE: 156 MMHG | WEIGHT: 191 LBS | HEIGHT: 67 IN

## 2025-07-03 PROCEDURE — 0502F SUBSEQUENT PRENATAL CARE: CPT

## 2025-07-11 NOTE — OB RN TRIAGE NOTE - RESPIRATORY RATE (BREATHS/MIN)
Pharmacy faxed in a request for prior authorization on:    Medication: dulaglutide (Trulicity) 1.5 MG/0.5ML pen-injector   Dosage:1.5 mg  Quantity requested:  2 ml  Pharmacy for prescription has been selected.  Add Key or PA number if applicable   Initiation of prior authorization needed.     18

## 2025-07-22 ENCOUNTER — OUTPATIENT (OUTPATIENT)
Dept: OUTPATIENT SERVICES | Facility: HOSPITAL | Age: 35
LOS: 1 days | End: 2025-07-22
Payer: COMMERCIAL

## 2025-07-22 ENCOUNTER — ASOB RESULT (OUTPATIENT)
Age: 35
End: 2025-07-22

## 2025-07-22 ENCOUNTER — APPOINTMENT (OUTPATIENT)
Dept: ANTEPARTUM | Facility: CLINIC | Age: 35
End: 2025-07-22
Payer: COMMERCIAL

## 2025-07-22 VITALS
SYSTOLIC BLOOD PRESSURE: 148 MMHG | DIASTOLIC BLOOD PRESSURE: 96 MMHG | OXYGEN SATURATION: 99 % | WEIGHT: 195 LBS | BODY MASS INDEX: 30.54 KG/M2 | HEART RATE: 102 BPM

## 2025-07-22 DIAGNOSIS — Z34.90 ENCOUNTER FOR SUPERVISION OF NORMAL PREGNANCY, UNSPECIFIED, UNSPECIFIED TRIMESTER: ICD-10-CM

## 2025-07-22 DIAGNOSIS — O09.90 SUPERVISION OF HIGH RISK PREGNANCY, UNSPECIFIED, UNSPECIFIED TRIMESTER: ICD-10-CM

## 2025-07-22 LAB
BILIRUB UR QL STRIP: NORMAL
BP DIAS: 96 MM HG
BP SYS: 148 MM HG
CLARITY UR: CLEAR
COLLECTION METHOD: NORMAL
FETAL MOVEMENT: PRESENT
GLUCOSE BLDC GLUCOMTR-MCNC: 93
GLUCOSE BLDC GLUCOMTR-MCNC: 93 MG/DL — SIGNIFICANT CHANGE UP (ref 70–99)
GLUCOSE UR-MCNC: NORMAL
HCG UR QL: 0.2 EU/DL
HGB UR QL STRIP.AUTO: NORMAL
KETONES UR-MCNC: NORMAL
LEUKOCYTE ESTERASE UR QL STRIP: NORMAL
NITRITE UR QL STRIP: NORMAL
OB COMMENTS: NORMAL
PH UR STRIP: 6
PROT UR STRIP-MCNC: NORMAL
SCHEDULED VISIT: YES
SP GR UR STRIP: 1.01
URINE ALBUMIN/PROTEIN: NORMAL
URINE GLUCOSE: NORMAL
URINE KETONES: NORMAL
WEEKS GESTATION: 24.5

## 2025-07-22 PROCEDURE — 81002 URINALYSIS NONAUTO W/O SCOPE: CPT

## 2025-07-22 PROCEDURE — 76816 OB US FOLLOW-UP PER FETUS: CPT | Mod: 26

## 2025-07-22 PROCEDURE — 99213 OFFICE O/P EST LOW 20 MIN: CPT | Mod: 25

## 2025-07-22 PROCEDURE — 76816 OB US FOLLOW-UP PER FETUS: CPT

## 2025-07-22 PROCEDURE — 82962 GLUCOSE BLOOD TEST: CPT

## 2025-07-22 PROCEDURE — 82948 REAGENT STRIP/BLOOD GLUCOSE: CPT

## 2025-07-22 RX ORDER — BLOOD-GLUCOSE,RECEIVER,CONT
EACH MISCELLANEOUS
Qty: 1 | Refills: 2 | Status: ACTIVE | COMMUNITY
Start: 2025-07-22 | End: 1900-01-01

## 2025-07-22 RX ORDER — LANCETS 28 GAUGE
EACH MISCELLANEOUS
Qty: 100 | Refills: 3 | Status: ACTIVE | COMMUNITY
Start: 2025-07-22 | End: 1900-01-01

## 2025-07-23 RX ORDER — INSULIN LISPRO 100 [IU]/ML
100 INJECTION, SOLUTION INTRAVENOUS; SUBCUTANEOUS 3 TIMES DAILY
Qty: 2 | Refills: 11 | Status: ACTIVE | COMMUNITY
Start: 2025-07-23 | End: 1900-01-01

## 2025-07-31 ENCOUNTER — APPOINTMENT (OUTPATIENT)
Dept: OBGYN | Facility: CLINIC | Age: 35
End: 2025-07-31
Payer: COMMERCIAL

## 2025-07-31 DIAGNOSIS — O09.299 SUPERVISION OF PREGNANCY WITH OTHER POOR REPRODUCTIVE OR OBSTETRIC HISTORY, UNSPECIFIED TRIMESTER: ICD-10-CM

## 2025-07-31 DIAGNOSIS — O24.112 PRE-EXISTING TYPE 2 DIABETES MELLITUS, IN PREGNANCY, SECOND TRIMESTER: ICD-10-CM

## 2025-07-31 DIAGNOSIS — O09.292 SUPERVISION OF PREGNANCY WITH OTHER POOR REPRODUCTIVE OR OBSTETRIC HISTORY, SECOND TRIMESTER: ICD-10-CM

## 2025-07-31 DIAGNOSIS — O10.919 UNSPECIFIED PRE-EXISTING HYPERTENSION COMPLICATING PREGNANCY, UNSPECIFIED TRIMESTER: ICD-10-CM

## 2025-07-31 DIAGNOSIS — Z3A.24 24 WEEKS GESTATION OF PREGNANCY: ICD-10-CM

## 2025-07-31 PROCEDURE — 0502F SUBSEQUENT PRENATAL CARE: CPT

## 2025-08-02 ENCOUNTER — OUTPATIENT (OUTPATIENT)
Dept: OUTPATIENT SERVICES | Facility: HOSPITAL | Age: 35
LOS: 1 days | End: 2025-08-02
Payer: COMMERCIAL

## 2025-08-02 DIAGNOSIS — E11.9 TYPE 2 DIABETES MELLITUS WITHOUT COMPLICATIONS: ICD-10-CM

## 2025-08-02 DIAGNOSIS — O10.911 UNSPECIFIED PRE-EXISTING HYPERTENSION COMPLICATING PREGNANCY, FIRST TRIMESTER: ICD-10-CM

## 2025-08-02 PROCEDURE — 84156 ASSAY OF PROTEIN URINE: CPT

## 2025-08-03 DIAGNOSIS — E11.9 TYPE 2 DIABETES MELLITUS WITHOUT COMPLICATIONS: ICD-10-CM

## 2025-08-03 DIAGNOSIS — O10.911 UNSPECIFIED PRE-EXISTING HYPERTENSION COMPLICATING PREGNANCY, FIRST TRIMESTER: ICD-10-CM

## 2025-08-05 ENCOUNTER — RESULT CHARGE (OUTPATIENT)
Age: 35
End: 2025-08-05

## 2025-08-05 ENCOUNTER — OUTPATIENT (OUTPATIENT)
Dept: OUTPATIENT SERVICES | Facility: HOSPITAL | Age: 35
LOS: 1 days | End: 2025-08-05
Payer: COMMERCIAL

## 2025-08-05 ENCOUNTER — APPOINTMENT (OUTPATIENT)
Dept: ANTEPARTUM | Facility: CLINIC | Age: 35
End: 2025-08-05
Payer: COMMERCIAL

## 2025-08-05 VITALS
BODY MASS INDEX: 30.54 KG/M2 | DIASTOLIC BLOOD PRESSURE: 83 MMHG | HEART RATE: 74 BPM | WEIGHT: 195 LBS | OXYGEN SATURATION: 100 % | SYSTOLIC BLOOD PRESSURE: 133 MMHG

## 2025-08-05 DIAGNOSIS — Z34.90 ENCOUNTER FOR SUPERVISION OF NORMAL PREGNANCY, UNSPECIFIED, UNSPECIFIED TRIMESTER: ICD-10-CM

## 2025-08-05 DIAGNOSIS — O09.90 SUPERVISION OF HIGH RISK PREGNANCY, UNSPECIFIED, UNSPECIFIED TRIMESTER: ICD-10-CM

## 2025-08-05 LAB — GLUCOSE BLDC GLUCOMTR-MCNC: 95 MG/DL — SIGNIFICANT CHANGE UP (ref 70–99)

## 2025-08-05 PROCEDURE — 99215 OFFICE O/P EST HI 40 MIN: CPT

## 2025-08-05 PROCEDURE — 82962 GLUCOSE BLOOD TEST: CPT

## 2025-08-08 LAB
BILIRUB UR QL STRIP: NORMAL
BP DIAS: 83 MM HG
BP SYS: 133 MM HG
CLARITY UR: CLEAR
COLLECTION METHOD: NORMAL
FETAL MOVEMENT: PRESENT
GLUCOSE BLDC GLUCOMTR-MCNC: 95
GLUCOSE UR-MCNC: NORMAL
HCG UR QL: 0.2 EU/DL
HGB UR QL STRIP.AUTO: NORMAL
KETONES UR-MCNC: NORMAL
LEUKOCYTE ESTERASE UR QL STRIP: NORMAL
NITRITE UR QL STRIP: NORMAL
OB COMMENTS: NORMAL
PH UR STRIP: 6.5
PROT UR STRIP-MCNC: NORMAL
SCHEDULED VISIT: YES
SP GR UR STRIP: 1.01
URINE ALBUMIN/PROTEIN: NORMAL
URINE GLUCOSE: NORMAL
URINE KETONES: NORMAL
WEEKS GESTATION: 26.5

## 2025-08-19 ENCOUNTER — APPOINTMENT (OUTPATIENT)
Dept: ANTEPARTUM | Facility: CLINIC | Age: 35
End: 2025-08-19
Payer: COMMERCIAL

## 2025-08-19 ENCOUNTER — ASOB RESULT (OUTPATIENT)
Age: 35
End: 2025-08-19

## 2025-08-19 ENCOUNTER — NON-APPOINTMENT (OUTPATIENT)
Age: 35
End: 2025-08-19

## 2025-08-19 ENCOUNTER — OUTPATIENT (OUTPATIENT)
Dept: OUTPATIENT SERVICES | Facility: HOSPITAL | Age: 35
LOS: 1 days | End: 2025-08-19
Payer: COMMERCIAL

## 2025-08-19 VITALS
HEART RATE: 104 BPM | OXYGEN SATURATION: 100 % | DIASTOLIC BLOOD PRESSURE: 81 MMHG | SYSTOLIC BLOOD PRESSURE: 138 MMHG | BODY MASS INDEX: 31.01 KG/M2 | WEIGHT: 198 LBS

## 2025-08-19 DIAGNOSIS — O09.90 SUPERVISION OF HIGH RISK PREGNANCY, UNSPECIFIED, UNSPECIFIED TRIMESTER: ICD-10-CM

## 2025-08-19 DIAGNOSIS — Z34.90 ENCOUNTER FOR SUPERVISION OF NORMAL PREGNANCY, UNSPECIFIED, UNSPECIFIED TRIMESTER: ICD-10-CM

## 2025-08-19 PROCEDURE — 76816 OB US FOLLOW-UP PER FETUS: CPT | Mod: 26

## 2025-08-19 PROCEDURE — 82962 GLUCOSE BLOOD TEST: CPT

## 2025-08-19 PROCEDURE — 99215 OFFICE O/P EST HI 40 MIN: CPT | Mod: 25

## 2025-08-19 PROCEDURE — 82948 REAGENT STRIP/BLOOD GLUCOSE: CPT

## 2025-08-19 PROCEDURE — 81002 URINALYSIS NONAUTO W/O SCOPE: CPT

## 2025-08-19 PROCEDURE — 76816 OB US FOLLOW-UP PER FETUS: CPT

## 2025-08-20 LAB
BILIRUB UR QL STRIP: NORMAL
BP DIAS: 81 MM HG
BP SYS: 138 MM HG
CLARITY UR: CLEAR
COLLECTION METHOD: NORMAL
FETAL HEART RATE (BPM): 145
FETAL MOVEMENT: PRESENT
GLUCOSE BLDC GLUCOMTR-MCNC: 102
GLUCOSE UR-MCNC: NORMAL
HCG UR QL: 0.2 EU/DL
HGB UR QL STRIP.AUTO: NORMAL
KETONES UR-MCNC: NORMAL
LEUKOCYTE ESTERASE UR QL STRIP: NORMAL
NITRITE UR QL STRIP: NORMAL
OB COMMENTS: NORMAL
PH UR STRIP: 7
PROT UR STRIP-MCNC: NORMAL
SCHEDULED VISIT: YES
SP GR UR STRIP: 1.01
URINE ALBUMIN/PROTEIN: NORMAL
URINE GLUCOSE: NORMAL
URINE KETONES: NORMAL
WEEKS GESTATION: 28.5

## 2025-08-22 DIAGNOSIS — E11.9 TYPE 2 DIABETES MELLITUS WITHOUT COMPLICATIONS: ICD-10-CM

## 2025-08-22 DIAGNOSIS — Z3A.24 24 WEEKS GESTATION OF PREGNANCY: ICD-10-CM

## 2025-08-22 DIAGNOSIS — O10.012 PRE-EXISTING ESSENTIAL HYPERTENSION COMPLICATING PREGNANCY, SECOND TRIMESTER: ICD-10-CM

## 2025-08-22 DIAGNOSIS — O24.112 PRE-EXISTING TYPE 2 DIABETES MELLITUS, IN PREGNANCY, SECOND TRIMESTER: ICD-10-CM

## 2025-08-22 DIAGNOSIS — Z3A.28 28 WEEKS GESTATION OF PREGNANCY: ICD-10-CM

## 2025-08-22 DIAGNOSIS — O10.919 UNSPECIFIED PRE-EXISTING HYPERTENSION COMPLICATING PREGNANCY, UNSPECIFIED TRIMESTER: ICD-10-CM

## 2025-08-22 DIAGNOSIS — O10.019 PRE-EXISTING ESSENTIAL HYPERTENSION COMPLICATING PREGNANCY, UNSPECIFIED TRIMESTER: ICD-10-CM

## 2025-08-22 DIAGNOSIS — O24.319 UNSPECIFIED PRE-EXISTING DIABETES MELLITUS IN PREGNANCY, UNSPECIFIED TRIMESTER: ICD-10-CM

## 2025-08-22 DIAGNOSIS — O24.912 UNSPECIFIED DIABETES MELLITUS IN PREGNANCY, SECOND TRIMESTER: ICD-10-CM

## 2025-08-28 ENCOUNTER — APPOINTMENT (OUTPATIENT)
Dept: OBGYN | Facility: CLINIC | Age: 35
End: 2025-08-28
Payer: COMMERCIAL

## 2025-08-28 VITALS
WEIGHT: 196 LBS | BODY MASS INDEX: 30.76 KG/M2 | DIASTOLIC BLOOD PRESSURE: 70 MMHG | SYSTOLIC BLOOD PRESSURE: 122 MMHG | HEIGHT: 67 IN

## 2025-08-28 PROCEDURE — 0502F SUBSEQUENT PRENATAL CARE: CPT

## 2025-09-08 ENCOUNTER — APPOINTMENT (OUTPATIENT)
Dept: ANTEPARTUM | Facility: CLINIC | Age: 35
End: 2025-09-08

## 2025-09-09 ENCOUNTER — APPOINTMENT (OUTPATIENT)
Dept: ANTEPARTUM | Facility: CLINIC | Age: 35
End: 2025-09-09
Payer: COMMERCIAL

## 2025-09-09 ENCOUNTER — NON-APPOINTMENT (OUTPATIENT)
Age: 35
End: 2025-09-09

## 2025-09-09 ENCOUNTER — ASOB RESULT (OUTPATIENT)
Age: 35
End: 2025-09-09

## 2025-09-09 VITALS — SYSTOLIC BLOOD PRESSURE: 147 MMHG | DIASTOLIC BLOOD PRESSURE: 82 MMHG

## 2025-09-09 VITALS
HEART RATE: 83 BPM | OXYGEN SATURATION: 99 % | BODY MASS INDEX: 30.85 KG/M2 | WEIGHT: 197 LBS | DIASTOLIC BLOOD PRESSURE: 86 MMHG | SYSTOLIC BLOOD PRESSURE: 127 MMHG

## 2025-09-09 PROCEDURE — 76816 OB US FOLLOW-UP PER FETUS: CPT | Mod: 26

## 2025-09-09 PROCEDURE — 99214 OFFICE O/P EST MOD 30 MIN: CPT | Mod: 25

## 2025-09-09 PROCEDURE — 76818 FETAL BIOPHYS PROFILE W/NST: CPT | Mod: 26,59

## 2025-09-10 LAB
BILIRUB UR QL STRIP: NORMAL
BP DIAS: 86 MM HG
BP SYS: 127 MM HG
CLARITY UR: CLEAR
COLLECTION METHOD: NORMAL
FETAL HEART RATE (BPM): 145
FETAL MOVEMENT: PRESENT
GLUCOSE BLDC GLUCOMTR-MCNC: 91
GLUCOSE UR-MCNC: NORMAL
HCG UR QL: 0.2 EU/DL
HGB UR QL STRIP.AUTO: NORMAL
KETONES UR-MCNC: NORMAL
LEUKOCYTE ESTERASE UR QL STRIP: NORMAL
NITRITE UR QL STRIP: NORMAL
OB COMMENTS: NORMAL
PH UR STRIP: 6
PROT UR STRIP-MCNC: NORMAL
SCHEDULED VISIT: YES
SP GR UR STRIP: 1.01
URINE ALBUMIN/PROTEIN: NORMAL
URINE GLUCOSE: NORMAL
URINE KETONES: NORMAL
WEEKS GESTATION: 31.5

## 2025-09-12 ENCOUNTER — APPOINTMENT (OUTPATIENT)
Dept: ANTEPARTUM | Facility: CLINIC | Age: 35
End: 2025-09-12

## 2025-09-16 ENCOUNTER — NON-APPOINTMENT (OUTPATIENT)
Age: 35
End: 2025-09-16

## 2025-09-16 ENCOUNTER — APPOINTMENT (OUTPATIENT)
Dept: ANTEPARTUM | Facility: CLINIC | Age: 35
End: 2025-09-16
Payer: COMMERCIAL

## 2025-09-16 ENCOUNTER — ASOB RESULT (OUTPATIENT)
Age: 35
End: 2025-09-16

## 2025-09-16 VITALS
OXYGEN SATURATION: 98 % | HEART RATE: 102 BPM | SYSTOLIC BLOOD PRESSURE: 126 MMHG | BODY MASS INDEX: 31.01 KG/M2 | DIASTOLIC BLOOD PRESSURE: 84 MMHG | WEIGHT: 198 LBS

## 2025-09-16 LAB
BILIRUB UR QL STRIP: NORMAL
BP DIAS: 84 MM HG
BP SYS: 126 MM HG
CLARITY UR: CLEAR
COLLECTION METHOD: NORMAL
FETAL HEART RATE (BPM): 145
FETAL MOVEMENT: PRESENT
GLUCOSE BLDC GLUCOMTR-MCNC: 96
GLUCOSE UR-MCNC: NORMAL
HCG UR QL: 0.2 EU/DL
HGB UR QL STRIP.AUTO: NORMAL
KETONES UR-MCNC: NORMAL
LEUKOCYTE ESTERASE UR QL STRIP: NORMAL
NITRITE UR QL STRIP: NORMAL
OB COMMENTS: NORMAL
PH UR STRIP: 6.5
PROT UR STRIP-MCNC: NORMAL
SCHEDULED VISIT: YES
SP GR UR STRIP: 1.01
URINE ALBUMIN/PROTEIN: NORMAL
URINE GLUCOSE: NORMAL
URINE KETONES: NORMAL
WEEKS GESTATION: 31.5

## 2025-09-16 PROCEDURE — ZZZZZ: CPT

## 2025-09-16 PROCEDURE — 99214 OFFICE O/P EST MOD 30 MIN: CPT | Mod: 25

## 2025-09-16 PROCEDURE — 76818 FETAL BIOPHYS PROFILE W/NST: CPT | Mod: 26

## 2025-09-18 ENCOUNTER — APPOINTMENT (OUTPATIENT)
Dept: ANTEPARTUM | Facility: CLINIC | Age: 35
End: 2025-09-18

## 2025-09-19 ENCOUNTER — RX RENEWAL (OUTPATIENT)
Age: 35
End: 2025-09-19